# Patient Record
Sex: MALE | Race: WHITE | NOT HISPANIC OR LATINO | Employment: PART TIME | URBAN - METROPOLITAN AREA
[De-identification: names, ages, dates, MRNs, and addresses within clinical notes are randomized per-mention and may not be internally consistent; named-entity substitution may affect disease eponyms.]

---

## 2017-03-22 ENCOUNTER — APPOINTMENT (OUTPATIENT)
Dept: MRI IMAGING | Facility: HOSPITAL | Age: 67
End: 2017-03-22

## 2017-03-22 ENCOUNTER — HOSPITAL ENCOUNTER (INPATIENT)
Facility: HOSPITAL | Age: 67
LOS: 1 days | Discharge: HOME | End: 2017-03-24
Attending: FAMILY MEDICINE | Admitting: INTERNAL MEDICINE

## 2017-03-22 ENCOUNTER — APPOINTMENT (OUTPATIENT)
Dept: CT IMAGING | Facility: HOSPITAL | Age: 67
End: 2017-03-22

## 2017-03-22 ENCOUNTER — APPOINTMENT (OUTPATIENT)
Dept: GENERAL RADIOLOGY | Facility: HOSPITAL | Age: 67
End: 2017-03-22

## 2017-03-22 DIAGNOSIS — G45.9 TRANSIENT CEREBRAL ISCHEMIA, UNSPECIFIED TYPE: Primary | ICD-10-CM

## 2017-03-22 DIAGNOSIS — I63.9 CEREBROVASCULAR ACCIDENT (CVA) OF LEFT PONTINE STRUCTURE (HCC): ICD-10-CM

## 2017-03-22 PROBLEM — R47.1 DYSARTHRIA: Status: ACTIVE | Noted: 2017-03-22

## 2017-03-22 PROBLEM — E11.40 TYPE 2 DIABETES MELLITUS WITH DIABETIC NEUROPATHY (HCC): Status: ACTIVE | Noted: 2017-03-22

## 2017-03-22 PROBLEM — G35 MULTIPLE SCLEROSIS (HCC): Status: ACTIVE | Noted: 2017-03-22

## 2017-03-22 PROBLEM — I10 ESSENTIAL HYPERTENSION: Status: ACTIVE | Noted: 2017-03-22

## 2017-03-22 LAB
ALBUMIN SERPL-MCNC: 4.6 G/DL (ref 3.5–5.2)
ALBUMIN/GLOB SERPL: 1.6 G/DL
ALP SERPL-CCNC: 130 U/L (ref 39–117)
ALT SERPL W P-5'-P-CCNC: 28 U/L (ref 1–41)
ANION GAP SERPL CALCULATED.3IONS-SCNC: 15.5 MMOL/L
AST SERPL-CCNC: 20 U/L (ref 1–40)
BASOPHILS # BLD AUTO: 0.01 10*3/MM3 (ref 0–0.2)
BASOPHILS NFR BLD AUTO: 0.2 % (ref 0–1.5)
BILIRUB SERPL-MCNC: 0.7 MG/DL (ref 0.1–1.2)
BUN BLD-MCNC: 22 MG/DL (ref 8–23)
BUN/CREAT SERPL: 23.2 (ref 7–25)
CALCIUM SPEC-SCNC: 9.8 MG/DL (ref 8.6–10.5)
CHLORIDE SERPL-SCNC: 102 MMOL/L (ref 98–107)
CO2 SERPL-SCNC: 24.5 MMOL/L (ref 22–29)
CREAT BLD-MCNC: 0.95 MG/DL (ref 0.76–1.27)
DEPRECATED RDW RBC AUTO: 51.7 FL (ref 37–54)
EOSINOPHIL # BLD AUTO: 0.04 10*3/MM3 (ref 0–0.7)
EOSINOPHIL NFR BLD AUTO: 0.8 % (ref 0.3–6.2)
ERYTHROCYTE [DISTWIDTH] IN BLOOD BY AUTOMATED COUNT: 14.7 % (ref 11.5–14.5)
GFR SERPL CREATININE-BSD FRML MDRD: 79 ML/MIN/1.73
GLOBULIN UR ELPH-MCNC: 2.9 GM/DL
GLUCOSE BLD-MCNC: 140 MG/DL (ref 65–99)
HCT VFR BLD AUTO: 41.6 % (ref 40.4–52.2)
HGB BLD-MCNC: 13.8 G/DL (ref 13.7–17.6)
IMM GRANULOCYTES # BLD: 0.03 10*3/MM3 (ref 0–0.03)
IMM GRANULOCYTES NFR BLD: 0.6 % (ref 0–0.5)
LYMPHOCYTES # BLD AUTO: 0.81 10*3/MM3 (ref 0.9–4.8)
LYMPHOCYTES NFR BLD AUTO: 15.3 % (ref 19.6–45.3)
MCH RBC QN AUTO: 32.2 PG (ref 27–32.7)
MCHC RBC AUTO-ENTMCNC: 33.2 G/DL (ref 32.6–36.4)
MCV RBC AUTO: 97.2 FL (ref 79.8–96.2)
MONOCYTES # BLD AUTO: 0.08 10*3/MM3 (ref 0.2–1.2)
MONOCYTES NFR BLD AUTO: 1.5 % (ref 5–12)
NEUTROPHILS # BLD AUTO: 4.34 10*3/MM3 (ref 1.9–8.1)
NEUTROPHILS NFR BLD AUTO: 81.6 % (ref 42.7–76)
PLATELET # BLD AUTO: 170 10*3/MM3 (ref 140–500)
PMV BLD AUTO: 10.4 FL (ref 6–12)
POTASSIUM BLD-SCNC: 4.2 MMOL/L (ref 3.5–5.2)
PROT SERPL-MCNC: 7.5 G/DL (ref 6–8.5)
RBC # BLD AUTO: 4.28 10*6/MM3 (ref 4.6–6)
SODIUM BLD-SCNC: 142 MMOL/L (ref 136–145)
TROPONIN T SERPL-MCNC: <0.01 NG/ML (ref 0–0.03)
WBC NRBC COR # BLD: 5.31 10*3/MM3 (ref 4.5–10.7)

## 2017-03-22 PROCEDURE — 80053 COMPREHEN METABOLIC PANEL: CPT | Performed by: FAMILY MEDICINE

## 2017-03-22 PROCEDURE — 0 GADOBENATE DIMEGLUMINE 529 MG/ML SOLUTION: Performed by: INTERNAL MEDICINE

## 2017-03-22 PROCEDURE — 82962 GLUCOSE BLOOD TEST: CPT

## 2017-03-22 PROCEDURE — 84484 ASSAY OF TROPONIN QUANT: CPT | Performed by: FAMILY MEDICINE

## 2017-03-22 PROCEDURE — 85025 COMPLETE CBC W/AUTO DIFF WBC: CPT | Performed by: FAMILY MEDICINE

## 2017-03-22 PROCEDURE — 93005 ELECTROCARDIOGRAM TRACING: CPT | Performed by: FAMILY MEDICINE

## 2017-03-22 PROCEDURE — 25810000003 SODIUM CHLORIDE 0.9 % WITH KCL 20 MEQ 20-0.9 MEQ/L-% SOLUTION: Performed by: INTERNAL MEDICINE

## 2017-03-22 PROCEDURE — G0378 HOSPITAL OBSERVATION PER HR: HCPCS

## 2017-03-22 PROCEDURE — 70450 CT HEAD/BRAIN W/O DYE: CPT

## 2017-03-22 PROCEDURE — 70544 MR ANGIOGRAPHY HEAD W/O DYE: CPT

## 2017-03-22 PROCEDURE — 93010 ELECTROCARDIOGRAM REPORT: CPT | Performed by: INTERNAL MEDICINE

## 2017-03-22 PROCEDURE — 71010 HC CHEST PA OR AP: CPT

## 2017-03-22 PROCEDURE — A9577 INJ MULTIHANCE: HCPCS | Performed by: INTERNAL MEDICINE

## 2017-03-22 PROCEDURE — 70553 MRI BRAIN STEM W/O & W/DYE: CPT

## 2017-03-22 PROCEDURE — 99285 EMERGENCY DEPT VISIT HI MDM: CPT

## 2017-03-22 PROCEDURE — 70549 MR ANGIOGRAPH NECK W/O&W/DYE: CPT

## 2017-03-22 RX ORDER — NICOTINE POLACRILEX 4 MG
15 LOZENGE BUCCAL
Status: DISCONTINUED | OUTPATIENT
Start: 2017-03-22 | End: 2017-03-24 | Stop reason: HOSPADM

## 2017-03-22 RX ORDER — AMANTADINE HYDROCHLORIDE 100 MG/1
100 TABLET ORAL DAILY
COMMUNITY
Start: 2016-02-29

## 2017-03-22 RX ORDER — ASPIRIN 325 MG
325 TABLET ORAL DAILY
Status: DISCONTINUED | OUTPATIENT
Start: 2017-03-23 | End: 2017-03-22

## 2017-03-22 RX ORDER — METOPROLOL SUCCINATE 25 MG/1
25 TABLET, EXTENDED RELEASE ORAL DAILY
COMMUNITY
Start: 2016-03-16

## 2017-03-22 RX ORDER — SODIUM CHLORIDE 0.9 % (FLUSH) 0.9 %
1-10 SYRINGE (ML) INJECTION AS NEEDED
Status: DISCONTINUED | OUTPATIENT
Start: 2017-03-22 | End: 2017-03-24 | Stop reason: HOSPADM

## 2017-03-22 RX ORDER — RAMIPRIL 10 MG/1
10 CAPSULE ORAL DAILY
Status: DISCONTINUED | OUTPATIENT
Start: 2017-03-23 | End: 2017-03-24 | Stop reason: HOSPADM

## 2017-03-22 RX ORDER — CALCIUM CARBONATE 200(500)MG
2 TABLET,CHEWABLE ORAL AS NEEDED
COMMUNITY

## 2017-03-22 RX ORDER — NITROGLYCERIN 0.4 MG/1
0.4 TABLET SUBLINGUAL
Status: DISCONTINUED | OUTPATIENT
Start: 2017-03-22 | End: 2017-03-24 | Stop reason: HOSPADM

## 2017-03-22 RX ORDER — CALCIUM CARBONATE 200(500)MG
2 TABLET,CHEWABLE ORAL AS NEEDED
Status: DISCONTINUED | OUTPATIENT
Start: 2017-03-22 | End: 2017-03-24 | Stop reason: HOSPADM

## 2017-03-22 RX ORDER — PIOGLITAZONEHYDROCHLORIDE 15 MG/1
15 TABLET ORAL 2 TIMES DAILY
Status: DISCONTINUED | OUTPATIENT
Start: 2017-03-23 | End: 2017-03-24 | Stop reason: HOSPADM

## 2017-03-22 RX ORDER — AMLODIPINE BESYLATE 5 MG/1
5 TABLET ORAL DAILY
COMMUNITY
Start: 2016-02-27

## 2017-03-22 RX ORDER — GLIMEPIRIDE 4 MG/1
4 TABLET ORAL DAILY
COMMUNITY
Start: 2016-02-29

## 2017-03-22 RX ORDER — SODIUM CHLORIDE 0.9 % (FLUSH) 0.9 %
10 SYRINGE (ML) INJECTION AS NEEDED
Status: DISCONTINUED | OUTPATIENT
Start: 2017-03-22 | End: 2017-03-24 | Stop reason: HOSPADM

## 2017-03-22 RX ORDER — ATORVASTATIN CALCIUM 80 MG/1
80 TABLET, FILM COATED ORAL NIGHTLY
Status: DISCONTINUED | OUTPATIENT
Start: 2017-03-22 | End: 2017-03-24 | Stop reason: HOSPADM

## 2017-03-22 RX ORDER — ROSUVASTATIN CALCIUM 10 MG/1
10 TABLET, COATED ORAL NIGHTLY
Status: ON HOLD | COMMUNITY
Start: 2016-02-27 | End: 2017-03-24

## 2017-03-22 RX ORDER — ACETAMINOPHEN 325 MG/1
650 TABLET ORAL EVERY 6 HOURS PRN
COMMUNITY

## 2017-03-22 RX ORDER — ONDANSETRON 4 MG/1
4 TABLET, ORALLY DISINTEGRATING ORAL EVERY 6 HOURS PRN
Status: DISCONTINUED | OUTPATIENT
Start: 2017-03-22 | End: 2017-03-24 | Stop reason: HOSPADM

## 2017-03-22 RX ORDER — ONDANSETRON 4 MG/1
4 TABLET, FILM COATED ORAL EVERY 6 HOURS PRN
Status: DISCONTINUED | OUTPATIENT
Start: 2017-03-22 | End: 2017-03-24 | Stop reason: HOSPADM

## 2017-03-22 RX ORDER — AMANTADINE HYDROCHLORIDE 100 MG/1
100 CAPSULE, GELATIN COATED ORAL DAILY
Status: DISCONTINUED | OUTPATIENT
Start: 2017-03-23 | End: 2017-03-24 | Stop reason: HOSPADM

## 2017-03-22 RX ORDER — RAMIPRIL 10 MG/1
10 CAPSULE ORAL DAILY
COMMUNITY
Start: 2016-02-27

## 2017-03-22 RX ORDER — PIOGLITAZONEHYDROCHLORIDE 15 MG/1
15 TABLET ORAL 2 TIMES DAILY
COMMUNITY

## 2017-03-22 RX ORDER — SODIUM CHLORIDE AND POTASSIUM CHLORIDE 150; 900 MG/100ML; MG/100ML
75 INJECTION, SOLUTION INTRAVENOUS CONTINUOUS
Status: DISPENSED | OUTPATIENT
Start: 2017-03-22 | End: 2017-03-23

## 2017-03-22 RX ORDER — CLOPIDOGREL BISULFATE 75 MG/1
75 TABLET ORAL DAILY
COMMUNITY
Start: 2016-04-20

## 2017-03-22 RX ORDER — ONDANSETRON 2 MG/ML
4 INJECTION INTRAMUSCULAR; INTRAVENOUS EVERY 6 HOURS PRN
Status: DISCONTINUED | OUTPATIENT
Start: 2017-03-22 | End: 2017-03-24 | Stop reason: HOSPADM

## 2017-03-22 RX ORDER — METOPROLOL SUCCINATE 25 MG/1
25 TABLET, EXTENDED RELEASE ORAL DAILY
Status: DISCONTINUED | OUTPATIENT
Start: 2017-03-23 | End: 2017-03-24 | Stop reason: HOSPADM

## 2017-03-22 RX ORDER — ASPIRIN 300 MG/1
300 SUPPOSITORY RECTAL DAILY
Status: DISCONTINUED | OUTPATIENT
Start: 2017-03-23 | End: 2017-03-22

## 2017-03-22 RX ORDER — CLOPIDOGREL BISULFATE 75 MG/1
75 TABLET ORAL DAILY
Status: DISCONTINUED | OUTPATIENT
Start: 2017-03-23 | End: 2017-03-24 | Stop reason: HOSPADM

## 2017-03-22 RX ORDER — ACETAMINOPHEN 325 MG/1
650 TABLET ORAL EVERY 4 HOURS PRN
Status: DISCONTINUED | OUTPATIENT
Start: 2017-03-22 | End: 2017-03-24 | Stop reason: HOSPADM

## 2017-03-22 RX ORDER — DEXTROSE MONOHYDRATE 25 G/50ML
25 INJECTION, SOLUTION INTRAVENOUS
Status: DISCONTINUED | OUTPATIENT
Start: 2017-03-22 | End: 2017-03-24 | Stop reason: HOSPADM

## 2017-03-22 RX ORDER — AMLODIPINE BESYLATE 5 MG/1
5 TABLET ORAL DAILY
Status: DISCONTINUED | OUTPATIENT
Start: 2017-03-23 | End: 2017-03-24 | Stop reason: HOSPADM

## 2017-03-22 RX ADMIN — GADOBENATE DIMEGLUMINE 20 ML: 529 INJECTION, SOLUTION INTRAVENOUS at 22:53

## 2017-03-22 RX ADMIN — ATORVASTATIN CALCIUM 80 MG: 80 TABLET, FILM COATED ORAL at 23:45

## 2017-03-22 RX ADMIN — POTASSIUM CHLORIDE AND SODIUM CHLORIDE 75 ML/HR: 900; 150 INJECTION, SOLUTION INTRAVENOUS at 23:44

## 2017-03-22 NOTE — ED PROVIDER NOTES
The patient presents complaining of a speech problem onset approximately 1500 today. Pt states that he checked his blood sugar this morning and it was 113. The pt states that he has not felt well all day. Pt states that he ate peanut butter crackers around 1230 but apparently his symptoms just progressed. The pt's family states that they called him around 1510 and that the pt's speech was slurred. The pt's family states that his R eye looked as if it was glazed over and that he had an unsteady gait. Pt's symptoms have since resolved even though he has had no food or drink since the crackers several hours ago. Pt denies any visual disturbances during the episode. The pt's family states that the episode lasted for multiple hours.    Patient is nontoxic appearing   Neuro: grossly normal    Time 1869  Discussed case with Dr. White  Reviewed history, exam, results and treatments. Discussed concerns and plan of care. Dr. White accepts pt to be admitted to telemetry.      I supervised care provided by the midlevel provider.  We have discussed this patient's history, physical exam, and treatment plan.  I have reviewed the note and personally saw and examined the patient and agree with the plan of care.    Entered by Bruce Morales, acting as scribe for Hayden De La Rosa MD.         Bruce Morales  03/22/17 1902       Hayden De La Rosa MD  03/23/17 0027

## 2017-03-22 NOTE — ED PROVIDER NOTES
"  EMERGENCY DEPARTMENT ENCOUNTER    CHIEF COMPLAINT  Chief Complaint: slurred speech   History given by:pt, pt's family   History limited by: none  Room Number: 03/03  PMD: Caio Lutz MD      HPI:  Pt is a 66 y.o. male who presents with slurred speech which he noticed this morning at 9 AM when he woke up. He states he was feeling generally weak this morning and noticed mild slurred speech at that time which he attributed to his MS. His daughter reports she talked to him at 12:15 PM and his speech was normal, but when he called at 02:50 PM his speech was slurred. Sx improved after about 20-30 minutes. Friend who saw him at this time, notes his right eye appeared \"glazed.\"  He has had similar sx in the past with hypoglycemia (did not check his blood sugar at time of sx) but blood glucose was 113 this morning. He also ate peanut butter crackers which normally improve sx, but did not this time. Currently taking Plavix. He denies CP, SOA, urinary sx, diarrhea, recent injury and other complaints at this time. Past Medical History of MS, diabetes and CAD.       Duration: 20-30 minutes   Timing: gradual   Location: n/a  Radiation:none  Quality: slurred speech   Intensity/Severity: moderate   Progression:resolved   Associated Symptoms: generalized weakness   Aggravating Factors: none  Alleviating Factors: none  Previous Episodes: He has had similar sx in the past with hypoglycemia.  Treatment before arrival: None reported     PAST MEDICAL HISTORY  Active Ambulatory Problems     Diagnosis Date Noted   • No Active Ambulatory Problems     Resolved Ambulatory Problems     Diagnosis Date Noted   • No Resolved Ambulatory Problems     Past Medical History:   Diagnosis Date   • Coronary disease    • Diabetes mellitus    • MS (multiple sclerosis)    • Myocardial infarction    • Seizures        PAST SURGICAL HISTORY  Past Surgical History:   Procedure Laterality Date   • CHOLECYSTECTOMY     • CORONARY ARTERY BYPASS GRAFT     • " EYE SURGERY     • HERNIA REPAIR         FAMILY HISTORY  History reviewed. No pertinent family history.    SOCIAL HISTORY  Social History     Social History   • Marital status:      Spouse name: N/A   • Number of children: N/A   • Years of education: N/A     Occupational History   • Not on file.     Social History Main Topics   • Smoking status: Never Smoker   • Smokeless tobacco: Not on file   • Alcohol use 4.2 oz/week     7 Glasses of wine per week   • Drug use: No   • Sexual activity: Not on file     Other Topics Concern   • Not on file     Social History Narrative   • No narrative on file         ALLERGIES  Review of patient's allergies indicates no known allergies.    REVIEW OF SYSTEMS  Review of Systems   Constitutional: Negative for chills and fever.   HENT: Negative for sore throat.    Respiratory: Negative for shortness of breath.    Cardiovascular: Negative for chest pain.   Gastrointestinal: Negative for nausea and vomiting.   Genitourinary: Negative for dysuria.   Musculoskeletal: Negative for back pain.   Skin: Negative for rash.   Neurological: Positive for speech difficulty and weakness (generalized). Negative for dizziness.   Psychiatric/Behavioral: The patient is not nervous/anxious.        PHYSICAL EXAM  ED Triage Vitals   Temp Heart Rate Resp BP SpO2   03/22/17 1611 03/22/17 1611 03/22/17 1614 03/22/17 1614 03/22/17 1611   96.6 °F (35.9 °C) 67 16 167/82 99 %       Physical Exam   Constitutional: He is oriented to person, place, and time and well-developed, well-nourished, and in no distress. No distress.   HENT:   Head: Normocephalic and atraumatic.   Mouth/Throat: Oropharynx is clear and moist and mucous membranes are normal.   Eyes: Conjunctivae and EOM are normal. Pupils are equal, round, and reactive to light. No scleral icterus.   Neck: Normal range of motion.   Cardiovascular: Normal rate, regular rhythm and normal heart sounds.    Pulmonary/Chest: Effort normal and breath sounds  normal.   Musculoskeletal: Normal range of motion.   Neurological: He is alert and oriented to person, place, and time. He has normal motor skills and normal strength. He displays facial symmetry and normal speech. He has a normal Finger-Nose-Finger Test. He shows no pronator drift.   Tongue midline.    Skin: Skin is warm and dry.   Psychiatric: Mood and affect normal.   Nursing note and vitals reviewed.      LAB RESULTS  Recent Results (from the past 24 hour(s))   Comprehensive Metabolic Panel    Collection Time: 03/22/17  5:23 PM   Result Value Ref Range    Glucose 140 (H) 65 - 99 mg/dL    BUN 22 8 - 23 mg/dL    Creatinine 0.95 0.76 - 1.27 mg/dL    Sodium 142 136 - 145 mmol/L    Potassium 4.2 3.5 - 5.2 mmol/L    Chloride 102 98 - 107 mmol/L    CO2 24.5 22.0 - 29.0 mmol/L    Calcium 9.8 8.6 - 10.5 mg/dL    Total Protein 7.5 6.0 - 8.5 g/dL    Albumin 4.60 3.50 - 5.20 g/dL    ALT (SGPT) 28 1 - 41 U/L    AST (SGOT) 20 1 - 40 U/L    Alkaline Phosphatase 130 (H) 39 - 117 U/L    Total Bilirubin 0.7 0.1 - 1.2 mg/dL    eGFR Non African Amer 79 >60 mL/min/1.73    Globulin 2.9 gm/dL    A/G Ratio 1.6 g/dL    BUN/Creatinine Ratio 23.2 7.0 - 25.0    Anion Gap 15.5 mmol/L   Troponin    Collection Time: 03/22/17  5:23 PM   Result Value Ref Range    Troponin T <0.010 0.000 - 0.030 ng/mL   CBC Auto Differential    Collection Time: 03/22/17  5:23 PM   Result Value Ref Range    WBC 5.31 4.50 - 10.70 10*3/mm3    RBC 4.28 (L) 4.60 - 6.00 10*6/mm3    Hemoglobin 13.8 13.7 - 17.6 g/dL    Hematocrit 41.6 40.4 - 52.2 %    MCV 97.2 (H) 79.8 - 96.2 fL    MCH 32.2 27.0 - 32.7 pg    MCHC 33.2 32.6 - 36.4 g/dL    RDW 14.7 (H) 11.5 - 14.5 %    RDW-SD 51.7 37.0 - 54.0 fl    MPV 10.4 6.0 - 12.0 fL    Platelets 170 140 - 500 10*3/mm3    Neutrophil % 81.6 (H) 42.7 - 76.0 %    Lymphocyte % 15.3 (L) 19.6 - 45.3 %    Monocyte % 1.5 (L) 5.0 - 12.0 %    Eosinophil % 0.8 0.3 - 6.2 %    Basophil % 0.2 0.0 - 1.5 %    Immature Grans % 0.6 (H) 0.0 - 0.5 %     "Neutrophils, Absolute 4.34 1.90 - 8.10 10*3/mm3    Lymphocytes, Absolute 0.81 (L) 0.90 - 4.80 10*3/mm3    Monocytes, Absolute 0.08 (L) 0.20 - 1.20 10*3/mm3    Eosinophils, Absolute 0.04 0.00 - 0.70 10*3/mm3    Basophils, Absolute 0.01 0.00 - 0.20 10*3/mm3    Immature Grans, Absolute 0.03 0.00 - 0.03 10*3/mm3       I ordered the above labs and reviewed the results    RADIOLOGY  XR Chest 1 View   Preliminary Result   Negative chest x-ray.          CT Head Without Contrast     No evidence for acute intracranial pathology.     Incidental note is made of a chronic infarct within the left PICA  distribution located within the left cerebellar hemisphere. This is  unchanged since previous exam of 08/09/2013.     CT Head - negative acute     I ordered the above noted radiological studies and reviewed the images on the PACS system.     PROGRESS AND CONSULTS    06:01 PM Reviewed pt's history and workup with Dr. De La Rosa.  At bedside evaluation, they agree with the plan of care.    07:00 PM Pt will be admitted to a [facility] bed per Dr. White ([facility]).   ADMISSION    Discussed treatment plan and reason for admission with pt/family and admitting physician.  Pt/family voiced understanding of the plan for admission for further testing/treatment as needed.      DIAGNOSIS  Final diagnoses:   Transient cerebral ischemia, unspecified type       COURSE & MEDICAL DECISION MAKING  Pertinent Labs and Imaging studies that were ordered and reviewed are noted above.  Results were reviewed/discussed with the patient and they were also made aware of online assess.     MEDICATIONS GIVEN IN ER  Medications   sodium chloride 0.9 % flush 10 mL (not administered)       /87 (BP Location: Right arm, Patient Position: Lying)  Pulse 87  Temp 96.6 °F (35.9 °C)  Resp 14  Ht 72.5\" (184.2 cm)  Wt 240 lb (109 kg)  SpO2 95%  BMI 32.1 kg/m2      I personally reviewed the past medical history, past surgical history, social history, family history, current " medications and allergies as they appear in this chart.  The scribe's note accurately reflects the work and decisions made by me.     I personally scribed for JOAQUIN Thomas on 3/22/2017 at 5:44 PM.  Electronically signed by Shani Soriano on 3/22/2017 at time 5:44 PM        Shani Soriano  03/22/17 1907       Chantell Montez, KARAN  03/22/17 2052       Hayden De La Rosa MD  03/23/17 0028

## 2017-03-23 ENCOUNTER — APPOINTMENT (OUTPATIENT)
Dept: CARDIOLOGY | Facility: HOSPITAL | Age: 67
End: 2017-03-23
Attending: INTERNAL MEDICINE

## 2017-03-23 PROBLEM — I63.9 CEREBROVASCULAR ACCIDENT (CVA) OF LEFT PONTINE STRUCTURE (HCC): Status: ACTIVE | Noted: 2017-03-22

## 2017-03-23 LAB
BH CV ECHO MEAS - ACS: 2.4 CM
BH CV ECHO MEAS - AO MEAN PG (FULL): 2 MMHG
BH CV ECHO MEAS - AO MEAN PG: 5 MMHG
BH CV ECHO MEAS - AO V2 MAX: 135 CM/SEC
BH CV ECHO MEAS - AO V2 MEAN: 104 CM/SEC
BH CV ECHO MEAS - AO V2 VTI: 34 CM
BH CV ECHO MEAS - AVA(I,A): 2.6 CM^2
BH CV ECHO MEAS - AVA(I,D): 2.6 CM^2
BH CV ECHO MEAS - BSA(HAYCOCK): 2.4 M^2
BH CV ECHO MEAS - BSA: 2.3 M^2
BH CV ECHO MEAS - BZI_BMI: 32.6 KILOGRAMS/M^2
BH CV ECHO MEAS - BZI_METRIC_HEIGHT: 182.9 CM
BH CV ECHO MEAS - BZI_METRIC_WEIGHT: 108.9 KG
BH CV ECHO MEAS - CONTRAST EF (2CH): 66.3 ML/M^2
BH CV ECHO MEAS - CONTRAST EF 4CH: 67.4 ML/M^2
BH CV ECHO MEAS - EDV(CUBED): 140.6 ML
BH CV ECHO MEAS - EDV(MOD-SP2): 80 ML
BH CV ECHO MEAS - EDV(MOD-SP4): 86 ML
BH CV ECHO MEAS - EDV(TEICH): 129.5 ML
BH CV ECHO MEAS - EF(CUBED): 69.5 %
BH CV ECHO MEAS - EF(MOD-SP2): 66.3 %
BH CV ECHO MEAS - EF(MOD-SP4): 67.4 %
BH CV ECHO MEAS - EF(TEICH): 60.7 %
BH CV ECHO MEAS - ESV(CUBED): 42.9 ML
BH CV ECHO MEAS - ESV(MOD-SP2): 27 ML
BH CV ECHO MEAS - ESV(MOD-SP4): 28 ML
BH CV ECHO MEAS - ESV(TEICH): 50.9 ML
BH CV ECHO MEAS - FS: 32.7 %
BH CV ECHO MEAS - IVS/LVPW: 1
BH CV ECHO MEAS - IVSD: 1 CM
BH CV ECHO MEAS - LA DIMENSION: 6 CM
BH CV ECHO MEAS - LAT PEAK E' VEL: 9 CM/SEC
BH CV ECHO MEAS - LV DIASTOLIC VOL/BSA (35-75): 37.4 ML/M^2
BH CV ECHO MEAS - LV MASS(C)D: 194.2 GRAMS
BH CV ECHO MEAS - LV MASS(C)DI: 84.3 GRAMS/M^2
BH CV ECHO MEAS - LV MEAN PG: 3 MMHG
BH CV ECHO MEAS - LV SYSTOLIC VOL/BSA (12-30): 12.2 ML/M^2
BH CV ECHO MEAS - LV V1 MAX: 116 CM/SEC
BH CV ECHO MEAS - LV V1 MEAN: 76 CM/SEC
BH CV ECHO MEAS - LV V1 VTI: 25.9 CM
BH CV ECHO MEAS - LVIDD: 5.2 CM
BH CV ECHO MEAS - LVIDS: 3.5 CM
BH CV ECHO MEAS - LVLD AP2: 8.1 CM
BH CV ECHO MEAS - LVLD AP4: 9 CM
BH CV ECHO MEAS - LVLS AP2: 8.2 CM
BH CV ECHO MEAS - LVLS AP4: 7.4 CM
BH CV ECHO MEAS - LVOT AREA (M): 3.5 CM^2
BH CV ECHO MEAS - LVOT AREA: 3.5 CM^2
BH CV ECHO MEAS - LVOT DIAM: 2.1 CM
BH CV ECHO MEAS - LVPWD: 1 CM
BH CV ECHO MEAS - MED PEAK E' VEL: 5 CM/SEC
BH CV ECHO MEAS - MV A DUR: 0.17 SEC
BH CV ECHO MEAS - MV A MAX VEL: 100 CM/SEC
BH CV ECHO MEAS - MV DEC SLOPE: 403 CM/SEC^2
BH CV ECHO MEAS - MV DEC TIME: 0.21 SEC
BH CV ECHO MEAS - MV E MAX VEL: 98.7 CM/SEC
BH CV ECHO MEAS - MV E/A: 0.99
BH CV ECHO MEAS - MV MEAN PG: 2 MMHG
BH CV ECHO MEAS - MV P1/2T MAX VEL: 111 CM/SEC
BH CV ECHO MEAS - MV P1/2T: 80.7 MSEC
BH CV ECHO MEAS - MV V2 MEAN: 57.9 CM/SEC
BH CV ECHO MEAS - MV V2 VTI: 41.6 CM
BH CV ECHO MEAS - MVA P1/2T LCG: 2 CM^2
BH CV ECHO MEAS - MVA(P1/2T): 2.7 CM^2
BH CV ECHO MEAS - MVA(VTI): 2.2 CM^2
BH CV ECHO MEAS - PA ACC SLOPE: 22.7 CM/SEC^2
BH CV ECHO MEAS - PA ACC TIME: 0.12 SEC
BH CV ECHO MEAS - PA MAX PG (FULL): 1.5 MMHG
BH CV ECHO MEAS - PA MAX PG: 3.7 MMHG
BH CV ECHO MEAS - PA PR(ACCEL): 25 MMHG
BH CV ECHO MEAS - PA V2 MAX: 96.4 CM/SEC
BH CV ECHO MEAS - PULM A REVS DUR: 0.09 SEC
BH CV ECHO MEAS - PULM A REVS VEL: 26 CM/SEC
BH CV ECHO MEAS - PULM DIAS VEL: 74.9 CM/SEC
BH CV ECHO MEAS - PULM S/D: 0.76
BH CV ECHO MEAS - PULM SYS VEL: 57.2 CM/SEC
BH CV ECHO MEAS - RAP SYSTOLE: 3 MMHG
BH CV ECHO MEAS - RV MAX PG: 2.3 MMHG
BH CV ECHO MEAS - RV MEAN PG: 1 MMHG
BH CV ECHO MEAS - RV V1 MAX: 75.2 CM/SEC
BH CV ECHO MEAS - RV V1 MEAN: 57.2 CM/SEC
BH CV ECHO MEAS - RV V1 VTI: 18.7 CM
BH CV ECHO MEAS - RVSP: 37.8 MMHG
BH CV ECHO MEAS - SI(CUBED): 42.5 ML/M^2
BH CV ECHO MEAS - SI(LVOT): 39 ML/M^2
BH CV ECHO MEAS - SI(MOD-SP2): 23 ML/M^2
BH CV ECHO MEAS - SI(MOD-SP4): 25.2 ML/M^2
BH CV ECHO MEAS - SI(TEICH): 34.2 ML/M^2
BH CV ECHO MEAS - SV(CUBED): 97.7 ML
BH CV ECHO MEAS - SV(LVOT): 89.7 ML
BH CV ECHO MEAS - SV(MOD-SP2): 53 ML
BH CV ECHO MEAS - SV(MOD-SP4): 58 ML
BH CV ECHO MEAS - SV(TEICH): 78.6 ML
BH CV ECHO MEAS - TR MAX VEL: 295 CM/SEC
BH CV XLRA - RV BASE: 4.2 CM
BH CV XLRA - TDI S': 13 CM/SEC
CHOLEST SERPL-MCNC: 105 MG/DL (ref 0–200)
E/E' RATIO: 15
GLUCOSE BLDC GLUCOMTR-MCNC: 107 MG/DL (ref 70–130)
GLUCOSE BLDC GLUCOMTR-MCNC: 126 MG/DL (ref 70–130)
GLUCOSE BLDC GLUCOMTR-MCNC: 139 MG/DL (ref 70–130)
GLUCOSE BLDC GLUCOMTR-MCNC: 150 MG/DL (ref 70–130)
GLUCOSE BLDC GLUCOMTR-MCNC: 188 MG/DL (ref 70–130)
HBA1C MFR BLD: 6.2 % (ref 4.8–5.6)
HDLC SERPL-MCNC: 42 MG/DL (ref 40–60)
LDLC SERPL CALC-MCNC: 41 MG/DL (ref 0–100)
LDLC/HDLC SERPL: 0.98 {RATIO}
LEFT ATRIUM VOLUME INDEX: 17 ML/M2
LEFT ATRIUM VOLUME: 38 CM3
TRIGL SERPL-MCNC: 110 MG/DL (ref 0–150)
VLDLC SERPL-MCNC: 22 MG/DL (ref 5–40)

## 2017-03-23 PROCEDURE — 80061 LIPID PANEL: CPT | Performed by: INTERNAL MEDICINE

## 2017-03-23 PROCEDURE — 99232 SBSQ HOSP IP/OBS MODERATE 35: CPT | Performed by: NURSE PRACTITIONER

## 2017-03-23 PROCEDURE — 93306 TTE W/DOPPLER COMPLETE: CPT | Performed by: INTERNAL MEDICINE

## 2017-03-23 PROCEDURE — C8929 TTE W OR WO FOL WCON,DOPPLER: HCPCS

## 2017-03-23 PROCEDURE — 36415 COLL VENOUS BLD VENIPUNCTURE: CPT | Performed by: INTERNAL MEDICINE

## 2017-03-23 PROCEDURE — 63710000001 INSULIN ASPART PER 5 UNITS: Performed by: INTERNAL MEDICINE

## 2017-03-23 PROCEDURE — 83036 HEMOGLOBIN GLYCOSYLATED A1C: CPT | Performed by: INTERNAL MEDICINE

## 2017-03-23 PROCEDURE — 82962 GLUCOSE BLOOD TEST: CPT

## 2017-03-23 PROCEDURE — 25010000002 PERFLUTREN (DEFINITY) 8.476 MG IN SODIUM CHLORIDE 10 ML INJECTION: Performed by: HOSPITALIST

## 2017-03-23 RX ORDER — ASPIRIN 81 MG/1
81 TABLET ORAL DAILY
Status: DISCONTINUED | OUTPATIENT
Start: 2017-03-23 | End: 2017-03-24 | Stop reason: HOSPADM

## 2017-03-23 RX ADMIN — PIOGLITAZONE 15 MG: 15 TABLET ORAL at 09:02

## 2017-03-23 RX ADMIN — PERFLUTREN 6 ML: 6.52 INJECTION, SUSPENSION INTRAVENOUS at 21:30

## 2017-03-23 RX ADMIN — RAMIPRIL 10 MG: 10 CAPSULE ORAL at 09:02

## 2017-03-23 RX ADMIN — METOPROLOL SUCCINATE 25 MG: 25 TABLET, FILM COATED, EXTENDED RELEASE ORAL at 09:02

## 2017-03-23 RX ADMIN — CLOPIDOGREL 75 MG: 75 TABLET, FILM COATED ORAL at 09:02

## 2017-03-23 RX ADMIN — PIOGLITAZONE 15 MG: 15 TABLET ORAL at 17:26

## 2017-03-23 RX ADMIN — INSULIN ASPART 2 UNITS: 100 INJECTION, SOLUTION INTRAVENOUS; SUBCUTANEOUS at 12:30

## 2017-03-23 RX ADMIN — AMANTADINE HYDROCHLORIDE 100 MG: 100 CAPSULE ORAL at 09:02

## 2017-03-23 RX ADMIN — ASPIRIN 81 MG: 81 TABLET ORAL at 21:37

## 2017-03-23 RX ADMIN — AMLODIPINE BESYLATE 5 MG: 5 TABLET ORAL at 09:02

## 2017-03-23 RX ADMIN — ATORVASTATIN CALCIUM 80 MG: 80 TABLET, FILM COATED ORAL at 21:38

## 2017-03-23 NOTE — SIGNIFICANT NOTE
03/23/17 1033   Rehab Treatment   Discipline physical therapist   Rehab Evaluation   Evaluation Not Performed other (see comments)  (Pt reports independence w functional mobility at this time and in no need of skilled PT.  PT educated pt to ambulate in hallway and notified RN.  PT will sign off at this time.)

## 2017-03-23 NOTE — PLAN OF CARE
Problem: Patient Care Overview (Adult)  Goal: Plan of Care Review  Outcome: Ongoing (interventions implemented as appropriate)    03/23/17 0326   Coping/Psychosocial Response Interventions   Plan Of Care Reviewed With patient   Patient Care Overview   Progress no change   Outcome Evaluation   Outcome Summary/Follow up Plan Pt is a new admit.Pt denies any pain or discomfort.Pt on [facility](o) Neuro was called for consult.Will continue to monitor.       Goal: Adult Individualization and Mutuality  Outcome: Ongoing (interventions implemented as appropriate)    Problem: Stroke (Ischemic) (Adult)  Goal: Signs and Symptoms of Listed Potential Problems Will be Absent or Manageable (Stroke)  Outcome: Ongoing (interventions implemented as appropriate)

## 2017-03-23 NOTE — SIGNIFICANT NOTE
03/23/17 1023   Rehab Treatment   Discipline occupational therapist   Rehab Evaluation   Evaluation Not Performed patient/family declined evaluation  (Pt states has been up in chair and just returned to bed. States up on own in room and feels baseline function.  Visitor in room agrees pt . baseline function. Will not follow for OT at this time.[id number])

## 2017-03-23 NOTE — PROGRESS NOTES
"DAILY PROGRESS NOTE  [facility]    Patient Identification:  Name: Joe Shahid  Age: 66 y.o.  Sex: male  :  1950  MRN: [id number]         Primary Care Physician: Caio Lutz MD      Subjective  No new c/o.  No further dysarthria.    Objective:  General Appearance:  Comfortable, well-appearing, in no acute distress and not in pain.    Vital signs: (most recent): Blood pressure 149/83, pulse 55, temperature 98.2 °F (36.8 °C), temperature source Oral, resp. rate 16, height 72.5\" (184.2 cm), weight 240 lb 3 oz (109 kg), SpO2 98 %.    Lungs:  Normal respiratory rate and normal effort.    Heart: Normal rate.  Regular rhythm.    Extremities: There is no dependent edema.    Neurological: Patient is alert and oriented to person, place and time.    Skin:  Warm and dry.                Vital signs in last 24 hours:  Temp:  [96.6 °F (35.9 °C)-98.2 °F (36.8 °C)] 98.2 °F (36.8 °C)  Heart Rate:  [55-87] 55  Resp:  [14-20] 16  BP: (149-179)/(78-87) 149/83    Intake/Output:    Intake/Output Summary (Last 24 hours) at 17 1605  Last data filed at 17 1241   Gross per 24 hour   Intake             1710 ml   Output              203 ml   Net             1507 ml         Exam:    Physical Exam   Cardiovascular: Normal rate and regular rhythm.    Pulmonary/Chest: Effort normal.   Neurological: He is alert.   Skin: Skin is warm and dry.         Results from last 7 days  Lab Units 17  1723   WBC 10*3/mm3 5.31   HEMOGLOBIN g/dL 13.8   PLATELETS 10*3/mm3 170     Results from last 7 days  Lab Units 17  1723   SODIUM mmol/L 142   POTASSIUM mmol/L 4.2   CHLORIDE mmol/L 102   TOTAL CO2 mmol/L 24.5   BUN mg/dL 22   CREATININE mg/dL 0.95   GLUCOSE mg/dL 140*   Estimated Creatinine Clearance: 97 mL/min (by C-G formula based on Cr of 0.95).  Results from last 7 days  Lab Units 17  1723   CALCIUM mg/dL 9.8   ALBUMIN g/dL 4.60     Results from last 7 days  Lab Units 17  1723   ALBUMIN g/dL " 4.60   BILIRUBIN mg/dL 0.7   ALK PHOS U/L 130*   AST (SGOT) U/L 20   ALT (SGPT) U/L 28       Assessment:  Principal Problem:    Cerebrovascular accident (CVA) of left pontine structure  Active Problems:    Type 2 diabetes mellitus with diabetic neuropathy    Multiple sclerosis    Dysarthria    Essential hypertension        Plan:  Cont w CVA w/u.  Discussed w Neurology.     Priyank Corrales MD  3/23/2017  4:05 PM

## 2017-03-23 NOTE — CONSULTS
NEUROLOGY CONSULTATION    KARAN Rodríguez with Donta Partida MD    REQUESTING PHYSICIAN: Bryce White MD    CHIEF COMPLAINT: Speech difficulty    DIAGNOSIS: Stroke    HISTORY OF PRESENT ILLNESS: This patient is a 66-year-old male with a history of multiple sclerosis, CAD, Diabetes Mellitus, MI, and seizures. He presented to [facility] on 3/22/17 with slurred speech and expressie aphasia that began around 8-9 AM that morning. His symptoms persisted throughout the day and then resolved around 5 pm. He denies any numbness or worsening of weakness. He states his right side is slightly weaker and has been for years from the multiple sclerosis. Initially he thought he was hypoglycemic as he has had a seizure related to hypoglycemia in the past. He checked his blood sugar and it was 113.     He was diagnosed with MS at age 34. His wife states slurred speech led to the diagnosis as well as a right arm tremor. He denies any problems related to his MS until a few years ago when he began having an unsteady gait which was complicated by diabetic neuropathy. He follows with Dr. James for his MS.     Currently the patient feels back to baseline.     He was on Crestor 10 mg at the time of the event and Plavix 75 mg daily which he has been on since a cardiac stent placement.     PAST MEDICAL HISTORY:   1. CAD  2. Diabetes Mellitus  3. MS  4. Myocardial infarction  5. Seizures    PAST SURGICAL HISTORY:   1. Cholecystectomy  2. CABG  3. Right eye surgery  4. Hernia repair    SOCIAL HISTORY: He lives at home with his wife. Denies tobacco or drug abuse. Drinks one glass of wine per night.     FAMILY HISTORY: Reviewed and noncontributory to present illness.     HOME MEDICATIONS:   1. Acetaminophen  2. Amantadine  3. Amlodipine  4. Tums  5. Plavix  6. Amaryl  7. Metformin  8. Metoprolol  9. Actos  10. Altace  11. Crestor 10 mg    PHYSICAL EXAMINATION:  General: Well nourished, well developed, and in no acute distress.  HEENT:  Normocephalic/atraumatic. Mucous membranes moist. Sclerae anicteric.   Heart: Regular rate and rhythm. No murmurs, rubs or gallops.  Lungs: Clear to auscultation bilaterally.  Skin: No notable rashes or lesions on the visible surfaces.   Extremities: No clubbing, cyanosis or significant edema.   Psychiatric: Pleasant, cooperative, and appropriate.   Neurologic Exam:  Mental Status:  Alert and oriented. Speech is fluent. Comprehension is intact.   Cranial Nerves II-XII: Left surgical pupil. Right pupil round, reactive to light. Extraocular movements are full and conjugate in all directions.No nystagmus noted. Hearing is intact to voice. Facial strength and sensation are preserved and symmetric. Tongue and palate midline. Voice non-hoarse, non-dysarthric.   Motor: Normal bulk and tone of bilateral upper and lower extremities. Strength is 5/5 in all 4 extremities both proximally and distally. There are no abnormal or involuntary movements noted.  Sensation: Intact to light touch throughout.   Coordination: Fully intact. Finger-to-nose performed accurately bilaterally.  Reflexes: The deep tendon reflexes are 2+/4 in bilateral biceps, brachioradialis, triceps, patella, and Achilles.    Gait: Unsteady. Positive Romberg.     DIAGNOSTIC DATA: MRI brain images reviewed by Dr. Partida and reveals an acute pontine infarct. MRA of the head and neck were both negative. LDL 41, Hemglobin A1c 6.2.     IMPRESSION/PLAN: The patient has suffered an acute infarct in the mercy, which is most likely due to small vessel disease. He is back to baseline. He is unsteady on his feet but states he has been this way for years and feels he is at his baseline in regard to this as well. We recommend dual antiplatelet therapy with Plavix and ASA for two months and then resuming Plavix only. Continue Lipitor 80 mg in place of Crestor 10 mg. Awaiting results of echocardiogram. No further neurologic work up is necessary at this time.     Attending  note- In reviewing his records and his previous MRIs he likely does have MS mainly based on the lesions in the cervical and thoracic cord in previous MRIs.  His Sx of gradual decline in gait do suggest secondary progression which is often not responsive to treatement but this is not the reason for admission.  The MRI is highly suggestive of an acute infarct and he is doing very well with essentially no new findings associated with this.  I agree that the lesion in the left frontal lobe is likey a telangiectasia of no significance.  Agree with the above recommendations.    Caio Partida MD

## 2017-03-23 NOTE — H&P
Name: Joe Shahid ADMIT: 3/22/2017   : 1950  PCP: Caio Lutz MD    MRN: [id number] LOS: 0 days   AGE/SEX: 66 y.o. male  ROOM: 511/1     Chief Complaint   Patient presents with   • Speech Problem     Last known normal 1215.  Family noticed when calling him 1 hour ago.  Equal hand  and no facial droop noted in triage. Family note improvement on speech since 1 hour ago.        Subjective   Patient is a 66 y.o. male with a history of multiple sclerosis who presents with slurred speech and expressive aphasia which started early this AM-patient states probably around 8-9AM.  He states that he had complete situational awareness and comprehension of speech but had difficulty finding words as well as forming them.  He has not had a similar episode prior to this.  He states that he at first thought he was hypoglycemic as he has had some seizures related to hypoglycemia in the past.  He checked his BG and it was about 113 this AM-he has since eaten peanut butter and crackers on a few occasions to make sure he did not become hypoglycemic.  His speech issues resolved around 5:00-5:30PM today.  The patient has a diagnosis of multiple sclerosis which was made when he was 34 years old.  This has had a pretty indolent course until a few years ago when he began having an unsteady gait which was complicated by some diabetic peripheral neuropathy.  He follows with Dr. Jensen, and currently takes monthly infusions for this, though the patient and family do not recall the name of the medication.    History of Present Illness    Past Medical History:   Diagnosis Date   • Coronary disease    • Diabetes mellitus    • MS (multiple sclerosis)    • Myocardial infarction    • Seizures      Past Surgical History:   Procedure Laterality Date   • CHOLECYSTECTOMY     • CORONARY ARTERY BYPASS GRAFT     • EYE SURGERY     • HERNIA REPAIR       History reviewed. No pertinent family history.  Social History   Substance Use  Topics   • Smoking status: Never Smoker   • Smokeless tobacco: None   • Alcohol use 4.2 oz/week     7 Glasses of wine per week     Prescriptions Prior to Admission   Medication Sig Dispense Refill Last Dose   • amantadine (SYMMETREL) 100 MG tablet       • amLODIPine (NORVASC) 5 MG tablet       • clopidogrel (PLAVIX) 75 MG tablet       • glimepiride (AMARYL) 4 MG tablet       • metoprolol succinate XL (TOPROL-XL) 25 MG 24 hr tablet       • ramipril (ALTACE) 10 MG capsule       • rosuvastatin (CRESTOR) 10 MG tablet         Allergies:  Review of patient's allergies indicates no known allergies.    Review of Systems   Constitutional: Negative for chills, fatigue and fever.   HENT: Negative for congestion, trouble swallowing and voice change.    Eyes: Negative for redness and visual disturbance.   Respiratory: Negative for cough, choking and shortness of breath.    Cardiovascular: Positive for leg swelling. Negative for chest pain and palpitations.   Gastrointestinal: Negative for abdominal pain, constipation, diarrhea, nausea and vomiting.   Endocrine: Negative for cold intolerance and heat intolerance.   Genitourinary: Negative for difficulty urinating and dysuria.   Musculoskeletal: Positive for back pain and gait problem.   Skin: Negative for pallor and rash.   Neurological: Positive for speech difficulty and numbness. Negative for dizziness, tremors, syncope, facial asymmetry, weakness, light-headedness and headaches.   Hematological: Negative for adenopathy. Does not bruise/bleed easily.   Psychiatric/Behavioral: Negative for confusion and decreased concentration.        Objective    Vital Signs  Temp:  [96.6 °F (35.9 °C)] 96.6 °F (35.9 °C)  Heart Rate:  [56-87] 61  Resp:  [14-16] 16  BP: (154-171)/(78-87) 154/79  SpO2:  [95 %-99 %] 98 %  on   ;   O2 Device: room air  Body mass index is 32.1 kg/(m^2).    Physical Exam   Constitutional: He is oriented to person, place, and time. No distress.   HENT:   Head:  Normocephalic and atraumatic.   Mouth/Throat: Oropharynx is clear and moist.   Eyes: Conjunctivae and EOM are normal. Pupils are equal, round, and reactive to light. No scleral icterus.   Neck: Normal range of motion. Neck supple. No JVD present.   Cardiovascular: Normal rate, regular rhythm and intact distal pulses.    Pulmonary/Chest: Effort normal and breath sounds normal.   Abdominal: Soft. Bowel sounds are normal. There is no tenderness.   Musculoskeletal: He exhibits edema. He exhibits no tenderness.   1+ BLE edema (chronic)   Neurological: He is alert and oriented to person, place, and time. No cranial nerve deficit or sensory deficit. He exhibits normal muscle tone.   Skin: Skin is warm and dry. He is not diaphoretic.   Psychiatric: He has a normal mood and affect. His behavior is normal.   Nursing note and vitals reviewed.      Results Review:   I reviewed the patient's new clinical results.    Assessment/Plan     Principal Problem:    Transient cerebral ischemia  Active Problems:    Type 2 diabetes mellitus with diabetic neuropathy    Multiple sclerosis    Dysarthria    Essential hypertension      Assessment & Plan  Dysarthria/Expressive aphasia  Now resolved.  No evidence of hypoglycemia or seizure.  Given symptoms and time course I am concerned about a TIA.  This is unlikely due to MS.  Will start TIA/stroke protocol, tPA is not indicated as he is returned to baseline.  Will work up with MRI/MRA and echo.  Will also ask neurology to see.    Type 2 diabetes mellitus  Will hold oral meds and cover with ssi for now    Hypertension  Restart home meds    Multiple Sclerosis  Appears to be stable for now.  Can f/u with Dr. Jensen as scheduled.    DVT prophylaxis  TEDs/SCDs    Code Status  I discussed this with the patient and he is DNR as noted in the conditional code order.  Healthcare surrogate is his wife.      I discussed the patients findings and my recommendations with patient, family and nursing  staff.      Bryce White MD  [facility]  03/22/17  8:35 PM

## 2017-03-23 NOTE — PROGRESS NOTES
Discharge Planning Assessment  [facility]     Patient Name: Joe Shahid  MRN: [id number]  Today's Date: 3/23/2017    Admit Date: 3/22/2017          Discharge Needs Assessment       03/23/17 1604    Living Environment    Lives With spouse    Living Arrangements house   Basement, 2 stories, 3 steps to enter    Quality Of Family Relationships supportive    Discharge Needs Assessment    Equipment Currently Used at Home cane, straight    Transportation Available car;family or friend will provide            Discharge Plan       03/23/17 1605    Case Management/Social Work Plan    Plan Home, no needs, wife to transport    Additional Comments Spoke with patient. His wife was in the room and he  was agreeable to talking with wife, Pat, [phone]; present. Face sheet verified. Patient and his wife share a house with 3 steps to enter. He reports good support from his neighbors and friends. He has Medicare and [employer] as his supplemental. He has a [employer]  Plan for his prescription coverage and uses [facility] in [place]  or mail order. . Patient states he has ambulated the nurses station.  He uses a cane and a shower chair . No HH.  He anticipates no needs. ...........  Jenny Garcia [facility]        Discharge Placement     No information found                Demographic Summary       03/23/17 1601    Referral Information    Admission Type inpatient    Referral Source admission list    Reason For Consult discharge planning    Primary Care Physician Information    Name Caio Lutz MD            Functional Status       03/23/17 1602    Functional Status Current    Ambulation 2-->assistive person    Transferring 2-->assistive person    Toileting 2-->assistive person    Bathing 2-->assistive person    Dressing 2-->assistive person    Communication 0-->understands/communicates without difficulty    Functional Status Prior    Ambulation 1-->assistive equipment    Transferring 0-->independent    Toileting  0-->independent    Bathing 0-->independent    Dressing 0-->independent    Eating 0-->independent    Cognitive/Perceptual/Developmental    Developmental Stage (Eriksson's Stages of Development) Stage 8 (65 years-death/Late Adulthood) Integrity vs. Despair            Psychosocial     None            Abuse/Neglect     None            Legal     None            Substance Abuse     None            Patient Forms     None          EMANUEL Gonzales

## 2017-03-23 NOTE — PLAN OF CARE
"Problem: Patient Care Overview (Adult)  Goal: Plan of Care Review    03/23/17 1816   Coping/Psychosocial Response Interventions   Plan Of Care Reviewed With patient;spouse   Patient Care Overview   Progress improving   Outcome Evaluation   Outcome Summary/Follow up Plan NIH remains stable at 0, pr has returned to baseline, although gait is still unsteady he says this is r/t MS dx. Ambulated in [place]. Echo pending.       Goal: Adult Individualization and Mutuality  Outcome: Ongoing (interventions implemented as appropriate)    03/23/17 1816   Individualization   Patient Specific Goals home \"as soon as possible\"   Patient Specific Interventions Atorvastatin, clopidogrel specific medication education. Pt takes clopidogrel already since cardiac stenting in last few years, performs teach back r/t side effects       Goal: Discharge Needs Assessment  Outcome: Ongoing (interventions implemented as appropriate)    Problem: Stroke (Ischemic) (Adult)  Goal: Signs and Symptoms of Listed Potential Problems Will be Absent or Manageable (Stroke)  Outcome: Ongoing (interventions implemented as appropriate)      "

## 2017-03-24 VITALS
DIASTOLIC BLOOD PRESSURE: 69 MMHG | OXYGEN SATURATION: 98 % | HEART RATE: 58 BPM | HEIGHT: 73 IN | WEIGHT: 237.6 LBS | TEMPERATURE: 97.6 F | SYSTOLIC BLOOD PRESSURE: 156 MMHG | RESPIRATION RATE: 20 BRPM | BODY MASS INDEX: 31.49 KG/M2

## 2017-03-24 LAB
GLUCOSE BLDC GLUCOMTR-MCNC: 123 MG/DL (ref 70–130)
GLUCOSE BLDC GLUCOMTR-MCNC: 151 MG/DL (ref 70–130)
GLUCOSE BLDC GLUCOMTR-MCNC: 251 MG/DL (ref 70–130)

## 2017-03-24 PROCEDURE — 63710000001 INSULIN ASPART PER 5 UNITS: Performed by: INTERNAL MEDICINE

## 2017-03-24 PROCEDURE — 82962 GLUCOSE BLOOD TEST: CPT

## 2017-03-24 RX ORDER — ROSUVASTATIN CALCIUM 10 MG/1
20 TABLET, COATED ORAL NIGHTLY
Start: 2017-03-24

## 2017-03-24 RX ORDER — ASPIRIN 81 MG/1
81 TABLET ORAL DAILY
Qty: 90 TABLET | Refills: 0 | Status: SHIPPED | OUTPATIENT
Start: 2017-03-24

## 2017-03-24 RX ADMIN — PIOGLITAZONE 15 MG: 15 TABLET ORAL at 08:45

## 2017-03-24 RX ADMIN — ASPIRIN 81 MG: 81 TABLET ORAL at 08:45

## 2017-03-24 RX ADMIN — AMLODIPINE BESYLATE 5 MG: 5 TABLET ORAL at 08:45

## 2017-03-24 RX ADMIN — RAMIPRIL 10 MG: 10 CAPSULE ORAL at 08:45

## 2017-03-24 RX ADMIN — INSULIN ASPART 6 UNITS: 100 INJECTION, SOLUTION INTRAVENOUS; SUBCUTANEOUS at 12:32

## 2017-03-24 RX ADMIN — PIOGLITAZONE 15 MG: 15 TABLET ORAL at 18:40

## 2017-03-24 RX ADMIN — METOPROLOL SUCCINATE 25 MG: 25 TABLET, FILM COATED, EXTENDED RELEASE ORAL at 08:45

## 2017-03-24 RX ADMIN — AMANTADINE HYDROCHLORIDE 100 MG: 100 CAPSULE ORAL at 08:45

## 2017-03-24 RX ADMIN — CLOPIDOGREL 75 MG: 75 TABLET, FILM COATED ORAL at 08:45

## 2017-03-24 NOTE — DISCHARGE SUMMARY
PHYSICIAN DISCHARGE SUMMARY                                                                        [facility]    Patient Identification:  Name: Joe Shahid  Age: 66 y.o.  Sex: male  :  1950  MRN: [id number]  Primary Care Physician: Caio Lutz MD    Admit date: 3/22/2017  Discharge date and time: 3/24/2017     Discharged Condition: good    Discharge Diagnoses:Principal Problem:    Cerebrovascular accident (CVA) of left pontine structure  Active Problems:    Type 2 diabetes mellitus with diabetic neuropathy    Multiple sclerosis    Dysarthria    Essential hypertension         Hospital Course:  Pleasant 66-year-old gentleman with history of multiple sclerosis presents after an episode of dysarthria.  Please see H&P for full details.  MRI did show an acute pontine infarct to the left of midline.  MRA did not show any significant atherosclerotic vascular disease.  Echocardiogram did not show any potential embolic sources.  The patient remained afebrile vital signs stable throughout hospitalization with no recurrence of the symptomatology.  He states today that he is still feeling that he is at baseline.  The episode appears to be secondary to small vessel disease.  No dysrhythmias during hospitalization.  Was recommended to add aspirin to his regime for 3 months and then he can go to Plavix alone after that point.  He was also recommended that his Crestor be switched over to Lipitor but he notes that he has not been tolerant to Lipitor in the past due to severe muscle aching.  We'll attempt again to simply increase the Crestor to 20 mg daily.      Consults:     Consults     Date and Time Order Name Status Description    3/22/2017 2022 Inpatient Consult to Neurology Completed     3/22/2017 1831 [facility] (on-call MD unless specified) Completed             Discharge Exam:  Physical Exam   Afebrile vital signs stable.  Well-developed  well-nourished male no apparent distress.  Lungs with good air movement.  Heart regular rate and rhythm.  Alert oriented conversant cooperative and pleasant.     Disposition:  Home    Patient Instructions:    Joe Shahid   Home Medication Instructions RUTH:[id number]    Printed on:03/24/17 1930   Medication Information                      acetaminophen (TYLENOL) 325 MG tablet  Take 650 mg by mouth Every 6 (Six) Hours As Needed for Mild Pain (1-3).             amantadine (SYMMETREL) 100 MG tablet  100 mg Daily.             amLODIPine (NORVASC) 5 MG tablet  Take 5 mg by mouth Daily.             aspirin 81 MG EC tablet  Take 1 tablet by mouth Daily.             calcium carbonate (TUMS) 500 MG chewable tablet  Chew 2 tablets As Needed for indigestion or heartburn.             clopidogrel (PLAVIX) 75 MG tablet  Take 75 mg by mouth Daily.             glimepiride (AMARYL) 4 MG tablet  Take 4 mg by mouth Daily.             metFORMIN (GLUCOPHAGE) 850 MG tablet  Take 850 mg by mouth 2 (Two) Times a Day With Meals.             metoprolol succinate XL (TOPROL-XL) 25 MG 24 hr tablet  Take 25 mg by mouth Daily.             pioglitazone (ACTOS) 15 MG tablet  Take 15 mg by mouth 2 (Two) Times a Day.             ramipril (ALTACE) 10 MG capsule  Take 10 mg by mouth Daily.             rosuvastatin (CRESTOR) 10 MG tablet  Take 2 tablets by mouth Every Night.               No future appointments.  Additional Instructions for the Follow-ups that You Need to Schedule     Discharge Follow-up with PCP    As directed    Follow Up Details:  1 week       Discharge Follow-up with Specialty    As directed    Specialty:  Neurology   Follow Up Details:  As directed.             Follow-up Information     Follow up with Caio Lutz MD .    Specialty:  Internal Medicine    Why:  1 week    Contact information:    [address] [place] [address] [address]  [facility] [address] [phone]          Discharge Order     Start     Ordered    03/24/17 4194   Discharge patient  Once     Expected Discharge Date:  03/24/17    Discharge Disposition:  Home or Self Care        03/24/17 1930                Signed:  Priyank Corrales MD  3/24/2017  7:30 PM    EMR Dragon/Transcription disclaimer:   Much of this encounter note is an electronic transcription/translation of spoken language to printed text. The electronic translation of spoken language may permit erroneous, or at times, nonsensical words or phrases to be inadvertently transcribed; Although I have reviewed the note for such errors, some may still exist.

## 2017-03-24 NOTE — PLAN OF CARE
Problem: Patient Care Overview (Adult)  Goal: Plan of Care Review  Outcome: Ongoing (interventions implemented as appropriate)    03/24/17 0251   Coping/Psychosocial Response Interventions   Plan Of Care Reviewed With patient   Patient Care Overview   Progress improving   Outcome Evaluation   Outcome Summary/Follow up Plan Echo completed. NIH 0. Patient is a little unsteady when walking due to his MS. Uses cane and stand by assistance when ambulating to the restroom. VSS. Will continue to monitor.        Goal: Adult Individualization and Mutuality  Outcome: Ongoing (interventions implemented as appropriate)  Goal: Discharge Needs Assessment  Outcome: Ongoing (interventions implemented as appropriate)    Problem: Stroke (Ischemic) (Adult)  Goal: Signs and Symptoms of Listed Potential Problems Will be Absent or Manageable (Stroke)  Outcome: Ongoing (interventions implemented as appropriate)

## 2017-04-27 ENCOUNTER — TELEPHONE (OUTPATIENT)
Dept: NEUROLOGY | Facility: CLINIC | Age: 67
End: 2017-04-27

## 2017-05-09 ENCOUNTER — TELEPHONE (OUTPATIENT)
Dept: NEUROLOGY | Facility: CLINIC | Age: 67
End: 2017-05-09

## 2018-10-09 ENCOUNTER — APPOINTMENT (OUTPATIENT)
Dept: GENERAL RADIOLOGY | Facility: HOSPITAL | Age: 68
End: 2018-10-09

## 2018-10-09 ENCOUNTER — HOSPITAL ENCOUNTER (EMERGENCY)
Facility: HOSPITAL | Age: 68
Discharge: HOME | End: 2018-10-09
Attending: FAMILY MEDICINE | Admitting: EMERGENCY MEDICINE

## 2018-10-09 VITALS
TEMPERATURE: 99.5 F | BODY MASS INDEX: 33.46 KG/M2 | SYSTOLIC BLOOD PRESSURE: 147 MMHG | HEART RATE: 60 BPM | DIASTOLIC BLOOD PRESSURE: 78 MMHG | HEIGHT: 72 IN | WEIGHT: 247 LBS | RESPIRATION RATE: 15 BRPM | OXYGEN SATURATION: 98 %

## 2018-10-09 DIAGNOSIS — N39.0 ACUTE UTI: Primary | ICD-10-CM

## 2018-10-09 LAB
ALBUMIN SERPL-MCNC: 4.8 G/DL (ref 3.5–5.2)
ALBUMIN/GLOB SERPL: 1.5 G/DL
ALP SERPL-CCNC: 101 U/L (ref 39–117)
ALT SERPL W P-5'-P-CCNC: 23 U/L (ref 1–41)
ANION GAP SERPL CALCULATED.3IONS-SCNC: 18 MMOL/L
AST SERPL-CCNC: 15 U/L (ref 1–40)
B PERT DNA SPEC QL NAA+PROBE: NOT DETECTED
BACTERIA UR QL AUTO: ABNORMAL /HPF
BASOPHILS # BLD AUTO: 0.01 10*3/MM3 (ref 0–0.2)
BASOPHILS NFR BLD AUTO: 0.1 % (ref 0–1.5)
BILIRUB SERPL-MCNC: 1.7 MG/DL (ref 0.1–1.2)
BILIRUB UR QL STRIP: NEGATIVE
BUN BLD-MCNC: 13 MG/DL (ref 8–23)
BUN/CREAT SERPL: 11.6 (ref 7–25)
C PNEUM DNA NPH QL NAA+NON-PROBE: NOT DETECTED
CALCIUM SPEC-SCNC: 9.6 MG/DL (ref 8.6–10.5)
CHLORIDE SERPL-SCNC: 100 MMOL/L (ref 98–107)
CLARITY UR: ABNORMAL
CO2 SERPL-SCNC: 19 MMOL/L (ref 22–29)
COLOR UR: ABNORMAL
CREAT BLD-MCNC: 1.12 MG/DL (ref 0.76–1.27)
D-LACTATE SERPL-SCNC: 2 MMOL/L (ref 0.5–2)
DEPRECATED RDW RBC AUTO: 50.7 FL (ref 37–54)
EOSINOPHIL # BLD AUTO: 0 10*3/MM3 (ref 0–0.7)
EOSINOPHIL NFR BLD AUTO: 0 % (ref 0.3–6.2)
ERYTHROCYTE [DISTWIDTH] IN BLOOD BY AUTOMATED COUNT: 14.1 % (ref 11.5–14.5)
FLUAV H1 2009 PAND RNA NPH QL NAA+PROBE: NOT DETECTED
FLUAV H1 HA GENE NPH QL NAA+PROBE: NOT DETECTED
FLUAV H3 RNA NPH QL NAA+PROBE: NOT DETECTED
FLUAV SUBTYP SPEC NAA+PROBE: NOT DETECTED
FLUBV RNA ISLT QL NAA+PROBE: NOT DETECTED
GFR SERPL CREATININE-BSD FRML MDRD: 65 ML/MIN/1.73
GLOBULIN UR ELPH-MCNC: 3.2 GM/DL
GLUCOSE BLD-MCNC: 134 MG/DL (ref 65–99)
GLUCOSE BLDC GLUCOMTR-MCNC: 120 MG/DL (ref 70–130)
GLUCOSE UR STRIP-MCNC: NEGATIVE MG/DL
HADV DNA SPEC NAA+PROBE: NOT DETECTED
HCOV 229E RNA SPEC QL NAA+PROBE: NOT DETECTED
HCOV HKU1 RNA SPEC QL NAA+PROBE: NOT DETECTED
HCOV NL63 RNA SPEC QL NAA+PROBE: NOT DETECTED
HCOV OC43 RNA SPEC QL NAA+PROBE: NOT DETECTED
HCT VFR BLD AUTO: 46.5 % (ref 40.4–52.2)
HGB BLD-MCNC: 15.3 G/DL (ref 13.7–17.6)
HGB UR QL STRIP.AUTO: ABNORMAL
HMPV RNA NPH QL NAA+NON-PROBE: NOT DETECTED
HPIV1 RNA SPEC QL NAA+PROBE: NOT DETECTED
HPIV2 RNA SPEC QL NAA+PROBE: NOT DETECTED
HPIV3 RNA NPH QL NAA+PROBE: NOT DETECTED
HPIV4 P GENE NPH QL NAA+PROBE: NOT DETECTED
HYALINE CASTS UR QL AUTO: ABNORMAL /LPF
IMM GRANULOCYTES # BLD: 0.02 10*3/MM3 (ref 0–0.03)
IMM GRANULOCYTES NFR BLD: 0.2 % (ref 0–0.5)
KETONES UR QL STRIP: ABNORMAL
LEUKOCYTE ESTERASE UR QL STRIP.AUTO: ABNORMAL
LYMPHOCYTES # BLD AUTO: 0.54 10*3/MM3 (ref 0.9–4.8)
LYMPHOCYTES NFR BLD AUTO: 4.8 % (ref 19.6–45.3)
M PNEUMO IGG SER IA-ACNC: NOT DETECTED
MCH RBC QN AUTO: 32.7 PG (ref 27–32.7)
MCHC RBC AUTO-ENTMCNC: 32.9 G/DL (ref 32.6–36.4)
MCV RBC AUTO: 99.4 FL (ref 79.8–96.2)
MONOCYTES # BLD AUTO: 1 10*3/MM3 (ref 0.2–1.2)
MONOCYTES NFR BLD AUTO: 9 % (ref 5–12)
NEUTROPHILS # BLD AUTO: 9.58 10*3/MM3 (ref 1.9–8.1)
NEUTROPHILS NFR BLD AUTO: 85.9 % (ref 42.7–76)
NITRITE UR QL STRIP: POSITIVE
PH UR STRIP.AUTO: <=5 [PH] (ref 5–8)
PLATELET # BLD AUTO: 182 10*3/MM3 (ref 140–500)
PMV BLD AUTO: 11 FL (ref 6–12)
POTASSIUM BLD-SCNC: 4.2 MMOL/L (ref 3.5–5.2)
PROCALCITONIN SERPL-MCNC: 0.46 NG/ML (ref 0.1–0.25)
PROT SERPL-MCNC: 8 G/DL (ref 6–8.5)
PROT UR QL STRIP: ABNORMAL
RBC # BLD AUTO: 4.68 10*6/MM3 (ref 4.6–6)
RBC # UR: ABNORMAL /HPF
REF LAB TEST METHOD: ABNORMAL
RHINOVIRUS RNA SPEC NAA+PROBE: NOT DETECTED
RSV RNA NPH QL NAA+NON-PROBE: NOT DETECTED
SODIUM BLD-SCNC: 137 MMOL/L (ref 136–145)
SP GR UR STRIP: >=1.03 (ref 1–1.03)
SQUAMOUS #/AREA URNS HPF: ABNORMAL /HPF
TROPONIN T SERPL-MCNC: <0.01 NG/ML (ref 0–0.03)
UROBILINOGEN UR QL STRIP: ABNORMAL
WBC NRBC COR # BLD: 11.15 10*3/MM3 (ref 4.5–10.7)
WBC UR QL AUTO: ABNORMAL /HPF

## 2018-10-09 PROCEDURE — 87077 CULTURE AEROBIC IDENTIFY: CPT | Performed by: EMERGENCY MEDICINE

## 2018-10-09 PROCEDURE — 83605 ASSAY OF LACTIC ACID: CPT | Performed by: EMERGENCY MEDICINE

## 2018-10-09 PROCEDURE — 51798 US URINE CAPACITY MEASURE: CPT

## 2018-10-09 PROCEDURE — 80053 COMPREHEN METABOLIC PANEL: CPT | Performed by: NURSE PRACTITIONER

## 2018-10-09 PROCEDURE — 84484 ASSAY OF TROPONIN QUANT: CPT | Performed by: NURSE PRACTITIONER

## 2018-10-09 PROCEDURE — 96361 HYDRATE IV INFUSION ADD-ON: CPT

## 2018-10-09 PROCEDURE — 87186 SC STD MICRODIL/AGAR DIL: CPT | Performed by: EMERGENCY MEDICINE

## 2018-10-09 PROCEDURE — 71046 X-RAY EXAM CHEST 2 VIEWS: CPT

## 2018-10-09 PROCEDURE — 93005 ELECTROCARDIOGRAM TRACING: CPT | Performed by: NURSE PRACTITIONER

## 2018-10-09 PROCEDURE — 87798 DETECT AGENT NOS DNA AMP: CPT | Performed by: EMERGENCY MEDICINE

## 2018-10-09 PROCEDURE — 87086 URINE CULTURE/COLONY COUNT: CPT | Performed by: EMERGENCY MEDICINE

## 2018-10-09 PROCEDURE — 81001 URINALYSIS AUTO W/SCOPE: CPT | Performed by: NURSE PRACTITIONER

## 2018-10-09 PROCEDURE — 99284 EMERGENCY DEPT VISIT MOD MDM: CPT

## 2018-10-09 PROCEDURE — 85025 COMPLETE CBC W/AUTO DIFF WBC: CPT | Performed by: NURSE PRACTITIONER

## 2018-10-09 PROCEDURE — 82962 GLUCOSE BLOOD TEST: CPT

## 2018-10-09 PROCEDURE — 96365 THER/PROPH/DIAG IV INF INIT: CPT

## 2018-10-09 PROCEDURE — 87486 CHLMYD PNEUM DNA AMP PROBE: CPT | Performed by: EMERGENCY MEDICINE

## 2018-10-09 PROCEDURE — 84145 PROCALCITONIN (PCT): CPT | Performed by: EMERGENCY MEDICINE

## 2018-10-09 PROCEDURE — 87581 M.PNEUMON DNA AMP PROBE: CPT | Performed by: EMERGENCY MEDICINE

## 2018-10-09 PROCEDURE — 87633 RESP VIRUS 12-25 TARGETS: CPT | Performed by: EMERGENCY MEDICINE

## 2018-10-09 PROCEDURE — 36415 COLL VENOUS BLD VENIPUNCTURE: CPT

## 2018-10-09 PROCEDURE — 25010000002 CEFTRIAXONE PER 250 MG: Performed by: EMERGENCY MEDICINE

## 2018-10-09 PROCEDURE — 93010 ELECTROCARDIOGRAM REPORT: CPT | Performed by: INTERNAL MEDICINE

## 2018-10-09 RX ORDER — SULFAMETHOXAZOLE AND TRIMETHOPRIM 800; 160 MG/1; MG/1
1 TABLET ORAL 2 TIMES DAILY
Qty: 20 TABLET | Refills: 0 | OUTPATIENT
Start: 2018-10-09 | End: 2019-07-31

## 2018-10-09 RX ORDER — CEFTRIAXONE SODIUM 1 G/50ML
1 INJECTION, SOLUTION INTRAVENOUS ONCE
Status: COMPLETED | OUTPATIENT
Start: 2018-10-09 | End: 2018-10-09

## 2018-10-09 RX ADMIN — CEFTRIAXONE SODIUM 1 G: 1 INJECTION, SOLUTION INTRAVENOUS at 18:29

## 2018-10-09 RX ADMIN — SODIUM CHLORIDE 500 ML: 9 INJECTION, SOLUTION INTRAVENOUS at 17:29

## 2018-10-09 NOTE — ED PROVIDER NOTES
EMERGENCY DEPARTMENT ENCOUNTER    CHIEF COMPLAINT  Chief Complaint: Fever, dysuria  History given by: Pt  History limited by: Nothing  Room Number: 28/28  PMD: Caio Lutz MD      HPI:  Pt is a 68 y.o. male who presents complaining of fever and dysuria which the pt noticed last night. The pt denies N/V/D, abd pain, new back pain, and confirms chest pressure (began last night), decreased appetite, frequency, and feeling poor. The pt states that the CP was mild and he rated it 2/10. The pt was seen at [facility].     Duration:  1 day  Onset: Gradual  Timing: Constant  Location: General,   Quality: Elevated  Intensity/Severity: Moderate  Progression: No change  Associated Symptoms: Chest pressure, decreased appetite, feeling poor, frequency  Aggravating Factors: Unknown  Alleviating Factors: Unknown  Previous Episodes: Unknown  Treatment before arrival: None    PAST MEDICAL HISTORY  Active Ambulatory Problems     Diagnosis Date Noted   • Cerebrovascular accident (CVA) of left pontine structure (CMS/HCC) 03/22/2017   • Type 2 diabetes mellitus with diabetic neuropathy (CMS/[facility]) 03/22/2017   • Multiple sclerosis (CMS/[facility]) 03/22/2017   • Dysarthria 03/22/2017   • Essential hypertension 03/22/2017     Resolved Ambulatory Problems     Diagnosis Date Noted   • No Resolved Ambulatory Problems     Past Medical History:   Diagnosis Date   • Coronary disease    • Diabetes mellitus (CMS/[facility])    • MS (multiple sclerosis) (CMS/[facility])    • Myocardial infarction (CMS/[facility])    • Seizures (CMS/[facility])        PAST SURGICAL HISTORY  Past Surgical History:   Procedure Laterality Date   • CHOLECYSTECTOMY     • CORONARY ARTERY BYPASS GRAFT     • EYE SURGERY     • HERNIA REPAIR         FAMILY HISTORY  History reviewed. No pertinent family history.    SOCIAL HISTORY  Social History     Social History   • Marital status:      Spouse name: N/A   • Number of children: N/A   • Years of education: N/A     Occupational History   • Not on file.      Social History Main Topics   • Smoking status: Never Smoker   • Smokeless tobacco: Never Used   • Alcohol use 4.2 oz/week     7 Glasses of wine per week   • Drug use: No   • Sexual activity: Not on file     Other Topics Concern   • Not on file     Social History Narrative   • No narrative on file       ALLERGIES  Patient has no known allergies.    REVIEW OF SYSTEMS  Review of Systems   Constitutional: Positive for fever. Negative for activity change and appetite change.   HENT: Negative for congestion and sore throat.    Eyes: Negative.    Respiratory: Negative for cough and shortness of breath.    Cardiovascular: Positive for chest pain. Negative for leg swelling.   Gastrointestinal: Negative for abdominal pain, diarrhea and vomiting.   Endocrine: Negative.    Genitourinary: Positive for dysuria and frequency. Negative for decreased urine volume.   Musculoskeletal: Negative for neck pain.   Skin: Negative for rash and wound.   Allergic/Immunologic: Negative.    Neurological: Negative for weakness, numbness and headaches.   Hematological: Negative.    Psychiatric/Behavioral: Negative.    All other systems reviewed and are negative.      PHYSICAL EXAM  ED Triage Vitals   Temp Heart Rate Resp BP SpO2   10/09/18 1437 10/09/18 1437 10/09/18 1437 10/09/18 1531 10/09/18 1437   99.5 °F (37.5 °C) 76 16 117/62 97 %      Temp src Heart Rate Source Patient Position BP Location FiO2 (%)   10/09/18 1437 -- -- -- --   Tympanic           Physical Exam   Constitutional: He is oriented to person, place, and time. No distress.   HENT:   Head: Normocephalic and atraumatic.   Eyes: Pupils are equal, round, and reactive to light. EOM are normal.   Neck: Normal range of motion. Neck supple.   Cardiovascular: Normal rate, regular rhythm and normal heart sounds.    Pulmonary/Chest: Effort normal and breath sounds normal. No respiratory distress.   Abdominal: Soft. There is no tenderness. There is no rebound and no guarding.    Musculoskeletal: Normal range of motion. He exhibits no edema.   Healed incision to L forearm from previous bypass site   Neurological: He is alert and oriented to person, place, and time. He has normal sensation and normal strength.   Skin: Skin is warm and dry.   Psychiatric: Mood and affect normal.   Nursing note and vitals reviewed.      LAB RESULTS  Lab Results (last 24 hours)     Procedure Component Value Units Date/Time    POCT Urinalysis (manual dipstick) [[id number]]  (Abnormal) Collected:  10/09/18 1331    Specimen:  Urine Updated:  10/09/18 1332     Color Yellow     Clarity, UA Clear     Glucose, UA Negative mg/dL      Bilirubin Negative     Ketones, UA Negative     Specific Gravity  1.030     Blood, UA Large (A)     pH, Urine 5.0     Protein,  mg/dL (A) mg/dL      Urobilinogen, UA Normal     Leukocytes Small (1+) (A)     Nitrite, UA Negative    CBC & Differential [[id number]] Collected:  10/09/18 1522    Specimen:  Blood Updated:  10/09/18 1540    Narrative:       The following orders were created for panel order CBC & Differential.  Procedure                               Abnormality         Status                     ---------                               -----------         ------                     CBC Auto Differential[[id number]]        Abnormal            Final result                 Please view results for these tests on the individual orders.    Comprehensive Metabolic Panel [[id number]]  (Abnormal) Collected:  10/09/18 1522    Specimen:  Blood Updated:  10/09/18 1557     Glucose 134 (H) mg/dL      BUN 13 mg/dL      Creatinine 1.12 mg/dL      Sodium 137 mmol/L      Potassium 4.2 mmol/L      Chloride 100 mmol/L      CO2 19.0 (L) mmol/L      Calcium 9.6 mg/dL      Total Protein 8.0 g/dL      Albumin 4.80 g/dL      ALT (SGPT) 23 U/L      AST (SGOT) 15 U/L      Alkaline Phosphatase 101 U/L      Total Bilirubin 1.7 (H) mg/dL      eGFR Non African Amer 65 mL/min/1.73      Globulin 3.2 gm/dL       A/G Ratio 1.5 g/dL      BUN/Creatinine Ratio 11.6     Anion Gap 18.0 mmol/L     Troponin [[id number]]  (Normal) Collected:  10/09/18 1522    Specimen:  Blood Updated:  10/09/18 1600     Troponin T <0.010 ng/mL     Narrative:       Troponin T Reference Ranges:  Less than 0.03 ng/mL:    Negative for AMI  0.03 to 0.09 ng/mL:      Indeterminant for AMI  Greater than 0.09 ng/mL: Positive for AMI    CBC Auto Differential [[id number]]  (Abnormal) Collected:  10/09/18 1522    Specimen:  Blood Updated:  10/09/18 1540     WBC 11.15 (H) 10*3/mm3      RBC 4.68 10*6/mm3      Hemoglobin 15.3 g/dL      Hematocrit 46.5 %      MCV 99.4 (H) fL      MCH 32.7 pg      MCHC 32.9 g/dL      RDW 14.1 %      RDW-SD 50.7 fl      MPV 11.0 fL      Platelets 182 10*3/mm3      Neutrophil % 85.9 (H) %      Lymphocyte % 4.8 (L) %      Monocyte % 9.0 %      Eosinophil % 0.0 (L) %      Basophil % 0.1 %      Immature Grans % 0.2 %      Neutrophils, Absolute 9.58 (H) 10*3/mm3      Lymphocytes, Absolute 0.54 (L) 10*3/mm3      Monocytes, Absolute 1.00 10*3/mm3      Eosinophils, Absolute 0.00 10*3/mm3      Basophils, Absolute 0.01 10*3/mm3      Immature Grans, Absolute 0.02 10*3/mm3     Procalcitonin [[id number]]  (Abnormal) Collected:  10/09/18 1522    Specimen:  Blood Updated:  10/09/18 1703     Procalcitonin 0.46 (H) ng/mL     Narrative:       As a Marker for Sepsis (Non-Neonates):   1. <0.5 ng/mL represents a low risk of severe sepsis and/or septic shock.  1. >2 ng/mL represents a high risk of severe sepsis and/or septic shock.    As a Marker for Lower Respiratory Tract Infections that require antibiotic therapy:  PCT on Admission     Antibiotic Therapy             6-12 Hrs later  > 0.5                Strongly Recommended            >0.25 - <0.5         Recommended  0.1 - 0.25           Discouraged                   Remeasure/reassess PCT  <0.1                 Strongly Discouraged          Remeasure/reassess PCT      As 28 day mortality risk marker:  "\"Change in Procalcitonin Result\" (> 80 % or <=80 %) if Day 0 (or Day 1) and Day 4 values are available. Refer to http://www.[facility]-pct-calculator.com/   Change in PCT <=80 %   A decrease of PCT levels below or equal to 80 % defines a positive change in PCT test result representing a higher risk for 28-day all-cause mortality of patients diagnosed with severe sepsis or septic shock.  Change in PCT > 80 %   A decrease of PCT levels of more than 80 % defines a negative change in PCT result representing a lower risk for 28-day all-cause mortality of patients diagnosed with severe sepsis or septic shock.                Urinalysis With Microscopic If Indicated (No Culture) - Urine, Clean Catch [[id number]]  (Abnormal) Collected:  10/09/18 1712    Specimen:  Urine from Urine, Clean Catch Updated:  10/09/18 1727     Color, UA Dark Yellow (A)     Appearance, UA Cloudy (A)     pH, UA <=5.0     Specific Gravity, UA >=1.030     Glucose, UA Negative     Ketones, UA 15 mg/dL (1+) (A)     Bilirubin, UA Negative     Blood, UA Trace (A)     Protein,  mg/dL (2+) (A)     Leuk Esterase, UA Moderate (2+) (A)     Nitrite, UA Positive (A)     Urobilinogen, UA 1.0 E.U./dL    Urinalysis, Microscopic Only - Urine, Clean Catch [[id number]]  (Abnormal) Collected:  10/09/18 1712    Specimen:  Urine from Urine, Clean Catch Updated:  10/09/18 1747     RBC, UA 3-5 (A) /HPF      WBC, UA Too Numerous to Count (A) /HPF      Bacteria, UA 2+ (A) /HPF      Squamous Epithelial Cells, UA 0-2 /HPF      Hyaline Casts, UA 7-12 /LPF      Methodology Manual Light Microscopy    POC Glucose Once [[id number]]  (Normal) Collected:  10/09/18 1724    Specimen:  Blood Updated:  10/09/18 1725     Glucose 120 mg/dL     Narrative:       Meter: [id number] : [id number] Enoc Gr RN    Lactic Acid, Plasma [[id number]]  (Normal) Collected:  10/09/18 1725    Specimen:  Blood Updated:  10/09/18 1754     Lactate 2.0 mmol/L     Respiratory Panel, PCR - Swab, " Nasopharynx [[id number]]  (Normal) Collected:  10/09/18 1732    Specimen:  Swab from Nasopharynx Updated:  10/09/18 1912     ADENOVIRUS, PCR Not Detected     Coronavirus 229E Not Detected     Coronavirus HKU1 Not Detected     Coronavirus NL63 Not Detected     Coronavirus OC43 Not Detected     Human Metapneumovirus Not Detected     Human Rhinovirus/Enterovirus Not Detected     Influenza B PCR Not Detected     Parainfluenza Virus 1 Not Detected     Parainfluenza Virus 2 Not Detected     Parainfluenza Virus 3 Not Detected     Parainfluenza Virus 4 Not Detected     Bordetella pertussis pcr Not Detected     Influenza A H1 2009 PCR Not Detected     Chlamydophila pneumoniae PCR Not Detected     Mycoplasma pneumo by PCR Not Detected     Influenza A PCR Not Detected     Influenza A H3 Not Detected     Influenza A H1 Not Detected     RSV, PCR Not Detected          I ordered the above labs and reviewed the results    RADIOLOGY  XR Chest 2 View   Final Result   No focal pulmonary consolidation. Borderline heart size.   Follow-up as clinical indications persist.       This report was finalized on 10/9/2018 4:26 PM by Dr. Jewel Dang M.D.               I ordered the above noted radiological studies. Interpreted by radiologist. Reviewed by me in PACS.       PROCEDURES  Procedures  EKG           EKG time: 1524  Rhythm/Rate: NSR, 72  P waves and MD: 1st degree block  QRS, axis: Normal   ST and T waves: Early transition, nonspecific ST changes     Interpreted Contemporaneously by me, independently viewed  Unchanged compared to prior 3/22/17      PROGRESS AND CONSULTS  ED Course as of Oct 09 1922   Tue Oct 09, 2018   1506 Cp and fever  [KG]      ED Course User Index  [KG] Dianna Gamble, APRN   1628 Ordered lactate, procalcitonin, respiratory panel, and bladder scan for further evaluation.     1810 Rechecked the patient and he is resting comfortably. Discussed pertinent lab and imaging results, including UA which shows a  UT and that I am waiting on his respiratory panel. Discussed the plan to discharge the pt if he feels improved after first dose of antibiotics. Patient agrees with plan and all questions were addressed.   Ordered rocephin for treatment of UTI.    1916 Rechecked the pt and discussed his negative respiratory panel. Discussed the plan to discharge the pt with a prescription for antibiotics. The pt agrees with the plan and all questions were addressed.     MEDICAL DECISION MAKING  Results were reviewed/discussed with the patient and they were also made aware of online access. Pt also made aware that some labs, such as cultures, will not be resulted during ER visit and follow up with PMD is necessary.     MDM  Number of Diagnoses or Management Options     Amount and/or Complexity of Data Reviewed  Clinical lab tests: ordered and reviewed (Troponin negative, WBC: 11.15, UA: nitrite positive, 2+ leuk esterase, 2+ bacteria, too numerous to count WBC)  Tests in the radiology section of CPT®: reviewed and ordered (CXR: Nothing acute)  Tests in the medicine section of CPT®: ordered and reviewed (See EKG)  Decide to obtain previous medical records or to obtain history from someone other than the patient: yes  Review and summarize past medical records: yes (The pt was seen at  for dysuria, frequency, and urgency today and was sent to the ER to rule out prostatitis. The pt had a temp of 101 at  and was given Tylenol.)    Patient Progress  Patient progress: improved         DIAGNOSIS  Final diagnoses:   Acute UTI       DISPOSITION  Disposition: Discharged.    Patient discharged in stable condition.    Reviewed implications of results, diagnosis, meds, responsibility to follow up, warning signs and symptoms of possible worsening, potential complications and reasons to return to ER, including new or worsening symptoms.    Patient/Family voiced understanding of above instructions.    Discussed plan for discharge, as there is no  emergent indication for admission.  Pt/family is agreeable and understands need for follow up and repeat testing.  Pt is aware that discharge does not mean that nothing is wrong but it indicates no emergency is present and they must continue care with follow-up as given below or physician of their choice.     FOLLOW-UP  Caio Lutz MD  [address] [place] [address] [address]  [facility] [address] [phone]    Schedule an appointment as soon as possible for a visit       Jc Kim Jr., MD  [address] [facility] [id number] [phone]    Schedule an appointment as soon as possible for a visit            Medication List      New Prescriptions    sulfamethoxazole-trimethoprim 800-160 MG per tablet  Commonly known as:  BACTRIM DS,SEPTRA DS  Take 1 tablet by mouth 2 (Two) Times a Day.            Latest Documented Vital Signs:  As of 7:22 PM  BP- 154/78 HR- 59 Temp- 99.5 °F (37.5 °C) (Tympanic) O2 sat- 98%    --  Documentation assistance provided by mary kay Olmstead for Dr. Morales.  Information recorded by the scribe was done at my direction and has been verified and validated by me.     Indigo Olmstead  10/09/18 1742       Indigo Olmstead  10/09/18 1922       Kong Morales MD  10/09/18 9990

## 2018-10-09 NOTE — ED NOTES
Pt reports intermittent dizziness for several weeks, chest pressure onset last night, saw PCP today and temp 101, given 1000mg Tylenol. Pt denies SOA or cough     Rolf, KATIE Delgado  10/09/18 [id number]

## 2018-10-09 NOTE — DISCHARGE INSTRUCTIONS
Drink plenty of fluids  Tylenol for fever  Antibiotics until gone  Return if symptoms worsen  Follow up with your Urologist

## 2018-10-11 LAB — BACTERIA SPEC AEROBE CULT: ABNORMAL

## 2019-01-21 NOTE — TELEPHONE ENCOUNTER
I S/W Mr. Shahid he is doing well.  Has had flares with his MS but does well.  We reviewed his meds.  Toprol XL 25mg daily,Norvasc 5mg daily, Altace 10mg daily.  Amaryl 4mgdaily, Metformin 850mg 2 times a day with meals.  His BS is usually under control unless he eats mexican food then can get elevated.  He takes Crestor 10mg 2 every night, ASA 81mg daily, and Plavix 75mg daily.  He does not have a F/U appt with Neuro and he said he would like a F/U.  We went over S/S of stroke and to call 911 immediately.  YARED Núñez RN  
[phone]

## 2019-07-31 ENCOUNTER — HOSPITAL ENCOUNTER (EMERGENCY)
Facility: HOSPITAL | Age: 69
Discharge: HOME | End: 2019-07-31
Attending: EMERGENCY MEDICINE | Admitting: EMERGENCY MEDICINE

## 2019-07-31 VITALS
HEART RATE: 75 BPM | RESPIRATION RATE: 18 BRPM | SYSTOLIC BLOOD PRESSURE: 168 MMHG | TEMPERATURE: 99 F | WEIGHT: 252.8 LBS | HEIGHT: 72 IN | BODY MASS INDEX: 34.24 KG/M2 | OXYGEN SATURATION: 98 % | DIASTOLIC BLOOD PRESSURE: 69 MMHG

## 2019-07-31 DIAGNOSIS — N39.0 ACUTE UTI: Primary | ICD-10-CM

## 2019-07-31 LAB
ALBUMIN SERPL-MCNC: 4.6 G/DL (ref 3.5–5.2)
ALBUMIN/GLOB SERPL: 2 G/DL
ALP SERPL-CCNC: 93 U/L (ref 39–117)
ALT SERPL W P-5'-P-CCNC: 23 U/L (ref 1–41)
ANION GAP SERPL CALCULATED.3IONS-SCNC: 12.6 MMOL/L (ref 5–15)
AST SERPL-CCNC: 14 U/L (ref 1–40)
BACTERIA UR QL AUTO: ABNORMAL /HPF
BASOPHILS # BLD AUTO: 0.02 10*3/MM3 (ref 0–0.2)
BASOPHILS NFR BLD AUTO: 0.2 % (ref 0–1.5)
BILIRUB SERPL-MCNC: 1.9 MG/DL (ref 0.2–1.2)
BILIRUB UR QL STRIP: NEGATIVE
BUN BLD-MCNC: 14 MG/DL (ref 8–23)
BUN/CREAT SERPL: 13.2 (ref 7–25)
CALCIUM SPEC-SCNC: 9.4 MG/DL (ref 8.6–10.5)
CHLORIDE SERPL-SCNC: 102 MMOL/L (ref 98–107)
CLARITY UR: CLEAR
CO2 SERPL-SCNC: 25.4 MMOL/L (ref 22–29)
COLOR UR: ABNORMAL
CREAT BLD-MCNC: 1.06 MG/DL (ref 0.76–1.27)
D-LACTATE SERPL-SCNC: 1.6 MMOL/L (ref 0.5–2)
DEPRECATED RDW RBC AUTO: 55.1 FL (ref 37–54)
EOSINOPHIL # BLD AUTO: 0 10*3/MM3 (ref 0–0.4)
EOSINOPHIL NFR BLD AUTO: 0 % (ref 0.3–6.2)
ERYTHROCYTE [DISTWIDTH] IN BLOOD BY AUTOMATED COUNT: 14.7 % (ref 12.3–15.4)
GFR SERPL CREATININE-BSD FRML MDRD: 69 ML/MIN/1.73
GLOBULIN UR ELPH-MCNC: 2.3 GM/DL
GLUCOSE BLD-MCNC: 132 MG/DL (ref 65–99)
GLUCOSE UR STRIP-MCNC: NEGATIVE MG/DL
HCT VFR BLD AUTO: 43 % (ref 37.5–51)
HGB BLD-MCNC: 13.8 G/DL (ref 13–17.7)
HGB UR QL STRIP.AUTO: ABNORMAL
HYALINE CASTS UR QL AUTO: ABNORMAL /LPF
IMM GRANULOCYTES # BLD AUTO: 0.05 10*3/MM3 (ref 0–0.05)
IMM GRANULOCYTES NFR BLD AUTO: 0.5 % (ref 0–0.5)
KETONES UR QL STRIP: ABNORMAL
LEUKOCYTE ESTERASE UR QL STRIP.AUTO: ABNORMAL
LIPASE SERPL-CCNC: 7 U/L (ref 13–60)
LYMPHOCYTES # BLD AUTO: 0.45 10*3/MM3 (ref 0.7–3.1)
LYMPHOCYTES NFR BLD AUTO: 4.2 % (ref 19.6–45.3)
MCH RBC QN AUTO: 32.6 PG (ref 26.6–33)
MCHC RBC AUTO-ENTMCNC: 32.1 G/DL (ref 31.5–35.7)
MCV RBC AUTO: 101.7 FL (ref 79–97)
MONOCYTES # BLD AUTO: 0.87 10*3/MM3 (ref 0.1–0.9)
MONOCYTES NFR BLD AUTO: 8.1 % (ref 5–12)
NEUTROPHILS # BLD AUTO: 9.34 10*3/MM3 (ref 1.7–7)
NEUTROPHILS NFR BLD AUTO: 87 % (ref 42.7–76)
NITRITE UR QL STRIP: POSITIVE
NRBC BLD AUTO-RTO: 0 /100 WBC (ref 0–0.2)
PH UR STRIP.AUTO: 6.5 [PH] (ref 5–8)
PLATELET # BLD AUTO: 130 10*3/MM3 (ref 140–450)
PMV BLD AUTO: 10.7 FL (ref 6–12)
POTASSIUM BLD-SCNC: 3.8 MMOL/L (ref 3.5–5.2)
PROCALCITONIN SERPL-MCNC: 0.32 NG/ML (ref 0.1–0.25)
PROT SERPL-MCNC: 6.9 G/DL (ref 6–8.5)
PROT UR QL STRIP: ABNORMAL
RBC # BLD AUTO: 4.23 10*6/MM3 (ref 4.14–5.8)
RBC # UR: ABNORMAL /HPF
REF LAB TEST METHOD: ABNORMAL
SODIUM BLD-SCNC: 140 MMOL/L (ref 136–145)
SP GR UR STRIP: 1.02 (ref 1–1.03)
SQUAMOUS #/AREA URNS HPF: ABNORMAL /HPF
UROBILINOGEN UR QL STRIP: ABNORMAL
WBC NRBC COR # BLD: 10.73 10*3/MM3 (ref 3.4–10.8)
WBC UR QL AUTO: ABNORMAL /HPF

## 2019-07-31 PROCEDURE — 87040 BLOOD CULTURE FOR BACTERIA: CPT | Performed by: PHYSICIAN ASSISTANT

## 2019-07-31 PROCEDURE — 87086 URINE CULTURE/COLONY COUNT: CPT | Performed by: PHYSICIAN ASSISTANT

## 2019-07-31 PROCEDURE — 99284 EMERGENCY DEPT VISIT MOD MDM: CPT

## 2019-07-31 PROCEDURE — 85025 COMPLETE CBC W/AUTO DIFF WBC: CPT | Performed by: PHYSICIAN ASSISTANT

## 2019-07-31 PROCEDURE — 83605 ASSAY OF LACTIC ACID: CPT | Performed by: PHYSICIAN ASSISTANT

## 2019-07-31 PROCEDURE — 87077 CULTURE AEROBIC IDENTIFY: CPT | Performed by: PHYSICIAN ASSISTANT

## 2019-07-31 PROCEDURE — 25010000002 CEFTRIAXONE PER 250 MG: Performed by: PHYSICIAN ASSISTANT

## 2019-07-31 PROCEDURE — 80053 COMPREHEN METABOLIC PANEL: CPT | Performed by: PHYSICIAN ASSISTANT

## 2019-07-31 PROCEDURE — 84145 PROCALCITONIN (PCT): CPT | Performed by: PHYSICIAN ASSISTANT

## 2019-07-31 PROCEDURE — 96365 THER/PROPH/DIAG IV INF INIT: CPT

## 2019-07-31 PROCEDURE — 36415 COLL VENOUS BLD VENIPUNCTURE: CPT

## 2019-07-31 PROCEDURE — 87150 DNA/RNA AMPLIFIED PROBE: CPT | Performed by: PHYSICIAN ASSISTANT

## 2019-07-31 PROCEDURE — 81001 URINALYSIS AUTO W/SCOPE: CPT | Performed by: PHYSICIAN ASSISTANT

## 2019-07-31 PROCEDURE — 87186 SC STD MICRODIL/AGAR DIL: CPT | Performed by: PHYSICIAN ASSISTANT

## 2019-07-31 PROCEDURE — 83690 ASSAY OF LIPASE: CPT | Performed by: PHYSICIAN ASSISTANT

## 2019-07-31 PROCEDURE — 87147 CULTURE TYPE IMMUNOLOGIC: CPT | Performed by: PHYSICIAN ASSISTANT

## 2019-07-31 RX ORDER — CEFTRIAXONE SODIUM 1 G/50ML
1 INJECTION, SOLUTION INTRAVENOUS ONCE
Status: COMPLETED | OUTPATIENT
Start: 2019-07-31 | End: 2019-07-31

## 2019-07-31 RX ORDER — SULFAMETHOXAZOLE AND TRIMETHOPRIM 800; 160 MG/1; MG/1
2 TABLET ORAL 2 TIMES DAILY
Qty: 40 TABLET | Refills: 0 | Status: SHIPPED | OUTPATIENT
Start: 2019-07-31

## 2019-07-31 RX ORDER — SODIUM CHLORIDE 0.9 % (FLUSH) 0.9 %
10 SYRINGE (ML) INJECTION AS NEEDED
Status: DISCONTINUED | OUTPATIENT
Start: 2019-07-31 | End: 2019-07-31 | Stop reason: HOSPADM

## 2019-07-31 RX ADMIN — CEFTRIAXONE SODIUM 1 G: 1 INJECTION, SOLUTION INTRAVENOUS at 15:29

## 2019-08-01 ENCOUNTER — TELEPHONE (OUTPATIENT)
Dept: SOCIAL WORK | Facility: HOSPITAL | Age: 69
End: 2019-08-01

## 2019-08-01 LAB — BACTERIA BLD CULT: ABNORMAL

## 2019-08-01 NOTE — TELEPHONE ENCOUNTER
"Asked by KARAN Grijalva to follow up with pt to see how he is feeling after his ER visit.  Pt states he is feeling mildly better, and that his back pain has improved a little.  However, pt states he still just feels, \"puny.\" Pt denies fever, n/v/d, or any other symptoms besides fatiue. Pt encouraged to return to the ER if his fatigue and malaise isn't improving in the next 2 days or so, or if he develop new or worsening symptoms.  Pt verbalized understanding, and thanked [facility] for f/u call.   KARAN Hansen updated.   "

## 2019-08-02 LAB — BACTERIA SPEC AEROBE CULT: ABNORMAL

## 2019-08-03 LAB
BACTERIA SPEC AEROBE CULT: ABNORMAL
GRAM STN SPEC: ABNORMAL
ISOLATED FROM: ABNORMAL

## 2019-08-05 LAB — BACTERIA SPEC AEROBE CULT: NORMAL
